# Patient Record
Sex: FEMALE | Race: WHITE | NOT HISPANIC OR LATINO | Employment: OTHER | URBAN - METROPOLITAN AREA
[De-identification: names, ages, dates, MRNs, and addresses within clinical notes are randomized per-mention and may not be internally consistent; named-entity substitution may affect disease eponyms.]

---

## 2019-05-02 ENCOUNTER — HOSPITAL ENCOUNTER (OUTPATIENT)
Dept: RADIOLOGY | Facility: MEDICAL CENTER | Age: 34
End: 2019-05-02
Attending: FAMILY MEDICINE
Payer: COMMERCIAL

## 2019-05-02 DIAGNOSIS — N92.0 EXCESSIVE OR FREQUENT MENSTRUATION: ICD-10-CM

## 2019-05-02 PROCEDURE — 76830 TRANSVAGINAL US NON-OB: CPT

## 2019-05-21 ENCOUNTER — APPOINTMENT (OUTPATIENT)
Dept: RADIOLOGY | Facility: MEDICAL CENTER | Age: 34
End: 2019-05-21
Attending: FAMILY MEDICINE
Payer: COMMERCIAL

## 2019-06-11 ENCOUNTER — APPOINTMENT (OUTPATIENT)
Dept: RADIOLOGY | Facility: MEDICAL CENTER | Age: 34
End: 2019-06-11
Attending: FAMILY MEDICINE
Payer: COMMERCIAL

## 2019-06-27 ENCOUNTER — HOSPITAL ENCOUNTER (OUTPATIENT)
Dept: RADIOLOGY | Facility: MEDICAL CENTER | Age: 34
End: 2019-06-27
Attending: FAMILY MEDICINE
Payer: COMMERCIAL

## 2019-06-27 DIAGNOSIS — N92.0 EXCESSIVE OR FREQUENT MENSTRUATION: ICD-10-CM

## 2019-06-27 PROCEDURE — 76830 TRANSVAGINAL US NON-OB: CPT

## 2019-07-23 ENCOUNTER — APPOINTMENT (OUTPATIENT)
Dept: RADIOLOGY | Facility: IMAGING CENTER | Age: 34
End: 2019-07-23
Attending: PHYSICIAN ASSISTANT
Payer: COMMERCIAL

## 2019-07-23 ENCOUNTER — OFFICE VISIT (OUTPATIENT)
Dept: URGENT CARE | Facility: CLINIC | Age: 34
End: 2019-07-23
Payer: COMMERCIAL

## 2019-07-23 VITALS
BODY MASS INDEX: 41.14 KG/M2 | TEMPERATURE: 97.6 F | SYSTOLIC BLOOD PRESSURE: 126 MMHG | WEIGHT: 256 LBS | RESPIRATION RATE: 16 BRPM | HEIGHT: 66 IN | DIASTOLIC BLOOD PRESSURE: 80 MMHG | OXYGEN SATURATION: 90 % | HEART RATE: 112 BPM

## 2019-07-23 DIAGNOSIS — R05.9 COUGH: ICD-10-CM

## 2019-07-23 DIAGNOSIS — J18.9 COMMUNITY ACQUIRED PNEUMONIA OF RIGHT LOWER LOBE OF LUNG: Primary | ICD-10-CM

## 2019-07-23 LAB
FLUAV+FLUBV AG SPEC QL IA: NEGATIVE
INT CON NEG: NORMAL
INT CON POS: NORMAL

## 2019-07-23 PROCEDURE — 99214 OFFICE O/P EST MOD 30 MIN: CPT | Mod: 25 | Performed by: PHYSICIAN ASSISTANT

## 2019-07-23 PROCEDURE — 71046 X-RAY EXAM CHEST 2 VIEWS: CPT | Mod: TC | Performed by: PHYSICIAN ASSISTANT

## 2019-07-23 PROCEDURE — 94640 AIRWAY INHALATION TREATMENT: CPT | Performed by: PHYSICIAN ASSISTANT

## 2019-07-23 PROCEDURE — 87804 INFLUENZA ASSAY W/OPTIC: CPT | Performed by: PHYSICIAN ASSISTANT

## 2019-07-23 RX ORDER — DOXYCYCLINE 100 MG/1
100 CAPSULE ORAL 2 TIMES DAILY
Qty: 14 CAP | Refills: 0 | Status: SHIPPED | OUTPATIENT
Start: 2019-07-23 | End: 2019-07-30

## 2019-07-23 RX ORDER — ALBUTEROL SULFATE 2.5 MG/3ML
2.5 SOLUTION RESPIRATORY (INHALATION) ONCE
Status: COMPLETED | OUTPATIENT
Start: 2019-07-23 | End: 2019-07-23

## 2019-07-23 RX ORDER — CODEINE PHOSPHATE/GUAIFENESIN 10-100MG/5
10 LIQUID (ML) ORAL
Qty: 50 ML | Refills: 0 | Status: SHIPPED | OUTPATIENT
Start: 2019-07-23 | End: 2019-07-28

## 2019-07-23 RX ORDER — METHYLPREDNISOLONE 4 MG/1
TABLET ORAL
Qty: 21 TAB | Refills: 0 | Status: SHIPPED | OUTPATIENT
Start: 2019-07-23 | End: 2019-09-16

## 2019-07-23 RX ADMIN — ALBUTEROL SULFATE 2.5 MG: 2.5 SOLUTION RESPIRATORY (INHALATION) at 19:27

## 2019-07-23 ASSESSMENT — ENCOUNTER SYMPTOMS
VOMITING: 0
NAUSEA: 0
SHORTNESS OF BREATH: 1
FEVER: 1
WHEEZING: 1
ABDOMINAL PAIN: 0
COUGH: 1
SPUTUM PRODUCTION: 0
DIARRHEA: 1
SORE THROAT: 0
CONSTIPATION: 0
CHILLS: 0

## 2019-07-24 NOTE — PATIENT INSTRUCTIONS
Community-Acquired Pneumonia, Adult  Pneumonia is an infection of the lungs. There are different types of pneumonia. One type can develop while a person is in a hospital. A different type, called community-acquired pneumonia, develops in people who are not, or have not recently been, in the hospital or other health care facility.  What are the causes?  Pneumonia may be caused by bacteria, viruses, or funguses. Community-acquired pneumonia is often caused by Streptococcus pneumonia bacteria. These bacteria are often passed from one person to another by breathing in droplets from the cough or sneeze of an infected person.  What increases the risk?  The condition is more likely to develop in:  · People who have chronic diseases, such as chronic obstructive pulmonary disease (COPD), asthma, congestive heart failure, cystic fibrosis, diabetes, or kidney disease.  · People who have early-stage or late-stage HIV.  · People who have sickle cell disease.  · People who have had their spleen removed (splenectomy).  · People who have poor dental hygiene.  · People who have medical conditions that increase the risk of breathing in (aspirating) secretions their own mouth and nose.  · People who have a weakened immune system (immunocompromised).  · People who smoke.  · People who travel to areas where pneumonia-causing germs commonly exist.  · People who are around animal habitats or animals that have pneumonia-causing germs, including birds, bats, rabbits, cats, and farm animals.  What are the signs or symptoms?  Symptoms of this condition include:  · A dry cough.  · A wet (productive) cough.  · Fever.  · Sweating.  · Chest pain, especially when breathing deeply or coughing.  · Rapid breathing or difficulty breathing.  · Shortness of breath.  · Shaking chills.  · Fatigue.  · Muscle aches.  How is this diagnosed?  Your health care provider will take a medical history and perform a physical exam. You may also have other tests,  including:  · Imaging studies of your chest, including X-rays.  · Tests to check your blood oxygen level and other blood gases.  · Other tests on blood, mucus (sputum), fluid around your lungs (pleural fluid), and urine.  If your pneumonia is severe, other tests may be done to identify the specific cause of your illness.  How is this treated?  The type of treatment that you receive depends on many factors, such as the cause of your pneumonia, the medicines you take, and other medical conditions that you have. For most adults, treatment and recovery from pneumonia may occur at home. In some cases, treatment must happen in a hospital. Treatment may include:  · Antibiotic medicines, if the pneumonia was caused by bacteria.  · Antiviral medicines, if the pneumonia was caused by a virus.  · Medicines that are given by mouth or through an IV tube.  · Oxygen.  · Respiratory therapy.  Although rare, treating severe pneumonia may include:  · Mechanical ventilation. This is done if you are not breathing well on your own and you cannot maintain a safe blood oxygen level.  · Thoracentesis. This procedure removes fluid around one lung or both lungs to help you breathe better.  Follow these instructions at home:  · Take over-the-counter and prescription medicines only as told by your health care provider.  ¨ Only take cough medicine if you are losing sleep. Understand that cough medicine can prevent your body’s natural ability to remove mucus from your lungs.  ¨ If you were prescribed an antibiotic medicine, take it as told by your health care provider. Do not stop taking the antibiotic even if you start to feel better.  · Sleep in a semi-upright position at night. Try sleeping in a reclining chair, or place a few pillows under your head.  · Do not use tobacco products, including cigarettes, chewing tobacco, and e-cigarettes. If you need help quitting, ask your health care provider.  · Drink enough water to keep your urine clear  or pale yellow. This will help to thin out mucus secretions in your lungs.  How is this prevented?  There are ways that you can decrease your risk of developing community-acquired pneumonia. Consider getting a pneumococcal vaccine if:  · You are older than 65 years of age.  · You are older than 19 years of age and are undergoing cancer treatment, have chronic lung disease, or have other medical conditions that affect your immune system. Ask your health care provider if this applies to you.  There are different types and schedules of pneumococcal vaccines. Ask your health care provider which vaccination option is best for you.  You may also prevent community-acquired pneumonia if you take these actions:  · Get an influenza vaccine every year. Ask your health care provider which type of influenza vaccine is best for you.  · Go to the dentist on a regular basis.  · Wash your hands often. Use hand  if soap and water are not available.  Contact a health care provider if:  · You have a fever.  · You are losing sleep because you cannot control your cough with cough medicine.  Get help right away if:  · You have worsening shortness of breath.  · You have increased chest pain.  · Your sickness becomes worse, especially if you are an older adult or have a weakened immune system.  · You cough up blood.  This information is not intended to replace advice given to you by your health care provider. Make sure you discuss any questions you have with your health care provider.  Document Released: 12/18/2006 Document Revised: 04/27/2017 Document Reviewed: 04/13/2016  [employer] Interactive Patient Education © 2017 [employer]

## 2019-07-24 NOTE — PROGRESS NOTES
Subjective:   Janel Benton is a 34 y.o. female who presents for Cough (cough, sweating x 1 week)        Cough   This is a new problem. Episode onset: 1 week  The problem has been unchanged. The cough is productive of sputum. Associated symptoms include ear pain, a fever, shortness of breath and wheezing. Pertinent negatives include no chills, ear congestion, nasal congestion or sore throat. The symptoms are aggravated by stress. Risk factors: Nonsmoker. Children with URI sx  Treatments tried: Advil, tylenol. The treatment provided mild relief. Her past medical history is significant for bronchitis. There is no history of asthma or pneumonia.     Review of Systems   Constitutional: Positive for fever. Negative for chills and malaise/fatigue.   HENT: Positive for congestion and ear pain. Negative for sore throat.    Respiratory: Positive for cough, shortness of breath and wheezing. Negative for sputum production.    Gastrointestinal: Positive for diarrhea. Negative for abdominal pain, constipation, nausea and vomiting.   All other systems reviewed and are negative.      PMH:  has no past medical history on file.  MEDS:   Current Outpatient Prescriptions:   •  doxycycline (MONODOX) 100 MG capsule, Take 1 Cap by mouth 2 times a day for 7 days., Disp: 14 Cap, Rfl: 0  •  methylPREDNISolone (MEDROL DOSEPAK) 4 MG Tablet Therapy Pack, Per  directions on package, Disp: 21 Tab, Rfl: 0  •  guaifenesin-codeine (TUSSI-ORGANIDIN NR) 100-10 MG/5ML syrup, Take 10 mL by mouth every bedtime for 5 days., Disp: 50 mL, Rfl: 0  •  paroxetine (PAXIL) 20 MG TABS, TAKE ONE TABLET BY MOUTH ONE TIME DAILY WITH FOOD, Disp: 30 Tab, Rfl: 0  •  clonazepam (KLONOPIN) 0.5 MG TABS, Take 0.5 Tabs by mouth 1 time daily as needed., Disp: 30 Tab, Rfl: 0  ALLERGIES:   Allergies   Allergen Reactions   • Gluten      SURGHX: No past surgical history on file.  SOCHX:  reports that she has never smoked. She has never used smokeless tobacco. She  "reports that she drinks alcohol. She reports that she does not use drugs.  Family History   Problem Relation Age of Onset   • Psychiatry Mother         anxiety   • Hyperlipidemia Mother    • GI Brother         Objective:   /80 (BP Location: Left arm, Patient Position: Sitting, BP Cuff Size: Large adult)   Pulse (!) 112   Temp 36.4 °C (97.6 °F) (Temporal)   Resp 16   Ht 1.676 m (5' 5.98\")   Wt 116.1 kg (256 lb)   SpO2 90%   BMI 41.34 kg/m²     Physical Exam   Constitutional: She is oriented to person, place, and time. She appears well-developed and well-nourished. No distress.   HENT:   Head: Normocephalic and atraumatic.   Nose: Nose normal.   Eyes: Pupils are equal, round, and reactive to light. Conjunctivae are normal.   Neck: Normal range of motion. Neck supple. No tracheal deviation present.   Cardiovascular: Normal rate and regular rhythm.    Pulmonary/Chest: Effort normal. She has wheezes in the right middle field and the right lower field.   Lymphadenopathy:     She has no cervical adenopathy.   Neurological: She is alert and oriented to person, place, and time.   Skin: Skin is warm and dry. Capillary refill takes less than 2 seconds.   Psychiatric: She has a normal mood and affect. Her behavior is normal.   Vitals reviewed.     FINDINGS:    The heart is not enlarged. There are bilateral airspace opacities, most prominent in the right lower lobe likely representing pneumonia. No pleural effusion or pneumothorax is identified.       Impression       Bilateral airspace opacities likely representing pneumonia, most prominent in the right lower lobe. Follow-up to radiographic resolution recommended.     Influenza: neg       Assessment/Plan:     1. Community acquired pneumonia of right lower lobe of lung (HCC)  doxycycline (MONODOX) 100 MG capsule    methylPREDNISolone (MEDROL DOSEPAK) 4 MG Tablet Therapy Pack    guaifenesin-codeine (TUSSI-ORGANIDIN NR) 100-10 MG/5ML syrup    REFERRAL TO FOLLOW-UP " WITH PRIMARY CARE   2. Cough  DX-CHEST-2 VIEWS    albuterol (PROVENTIL) 2.5mg/3ml nebulizer solution 2.5 mg    POCT Influenza A/B     Per my read, bilateral opacities R > L seen on x-ray.  Agree with radiology report.     Supportive care reviewed. CAP handout provided.     NV  was reviewed by myself-  Document  does not reveal any concerning patterns. Pt. was advised to avoid the operation of heavy machine along with driving while on such medications. Finally pt. was advised to use medication only as prescribed.     Follow-up with primary care provider within 7-10 days, referral placed.  If symptoms worsen or persist patient can return to clinic immediately for reevaluation.  Red flags and STRICT emergency room precautions discussed. All side effects of medication discussed including allergic response, GI upset, tendon injury, etc. Patient and family verbalized understanding of information.    Please note that this dictation was created using voice recognition software. I have made every reasonable attempt to correct obvious errors, but I expect that there are errors of grammar and possibly content that I did not discover before finalizing the note.

## 2019-08-23 ENCOUNTER — HOSPITAL ENCOUNTER (OUTPATIENT)
Dept: RADIOLOGY | Facility: MEDICAL CENTER | Age: 34
End: 2019-08-23
Attending: FAMILY MEDICINE
Payer: COMMERCIAL

## 2019-08-23 DIAGNOSIS — J15.8 PNEUMONIA DUE TO ANAEROBES (HCC): ICD-10-CM

## 2019-08-23 PROCEDURE — 71046 X-RAY EXAM CHEST 2 VIEWS: CPT

## 2019-09-11 ENCOUNTER — HOSPITAL ENCOUNTER (OUTPATIENT)
Dept: RADIOLOGY | Facility: MEDICAL CENTER | Age: 34
End: 2019-09-11
Attending: FAMILY MEDICINE
Payer: COMMERCIAL

## 2019-09-11 DIAGNOSIS — M79.671 RIGHT FOOT PAIN: ICD-10-CM

## 2019-09-11 PROCEDURE — 73620 X-RAY EXAM OF FOOT: CPT | Mod: RT

## 2019-09-16 DIAGNOSIS — Z01.812 PRE-PROCEDURAL LABORATORY EXAMINATION: ICD-10-CM

## 2019-09-16 DIAGNOSIS — Z01.810 PRE-OPERATIVE CARDIOVASCULAR EXAMINATION: ICD-10-CM

## 2019-09-16 LAB
ABO GROUP BLD: NORMAL
ALBUMIN SERPL BCP-MCNC: 3.9 G/DL (ref 3.2–4.9)
ALBUMIN/GLOB SERPL: 1.2 G/DL
ALP SERPL-CCNC: 69 U/L (ref 30–99)
ALT SERPL-CCNC: 6 U/L (ref 2–50)
ANION GAP SERPL CALC-SCNC: 11 MMOL/L (ref 0–11.9)
APPEARANCE UR: CLEAR
AST SERPL-CCNC: 10 U/L (ref 12–45)
B-HCG SERPL-ACNC: <0.6 MIU/ML (ref 0–5)
BACTERIA #/AREA URNS HPF: ABNORMAL /HPF
BASOPHILS # BLD AUTO: 0.5 % (ref 0–1.8)
BASOPHILS # BLD: 0.04 K/UL (ref 0–0.12)
BILIRUB SERPL-MCNC: 0.2 MG/DL (ref 0.1–1.5)
BILIRUB UR QL STRIP.AUTO: NEGATIVE
BLD GP AB SCN SERPL QL: NORMAL
BUN SERPL-MCNC: 12 MG/DL (ref 8–22)
CALCIUM SERPL-MCNC: 9.1 MG/DL (ref 8.5–10.5)
CHLORIDE SERPL-SCNC: 106 MMOL/L (ref 96–112)
CO2 SERPL-SCNC: 24 MMOL/L (ref 20–33)
COLOR UR: YELLOW
CREAT SERPL-MCNC: 0.8 MG/DL (ref 0.5–1.4)
EKG IMPRESSION: NORMAL
EOSINOPHIL # BLD AUTO: 0.49 K/UL (ref 0–0.51)
EOSINOPHIL NFR BLD: 6.6 % (ref 0–6.9)
EPI CELLS #/AREA URNS HPF: NEGATIVE /HPF
ERYTHROCYTE [DISTWIDTH] IN BLOOD BY AUTOMATED COUNT: 50.8 FL (ref 35.9–50)
GLOBULIN SER CALC-MCNC: 3.3 G/DL (ref 1.9–3.5)
GLUCOSE SERPL-MCNC: 115 MG/DL (ref 65–99)
GLUCOSE UR STRIP.AUTO-MCNC: NEGATIVE MG/DL
HCT VFR BLD AUTO: 39 % (ref 37–47)
HGB BLD-MCNC: 12.1 G/DL (ref 12–16)
HYALINE CASTS #/AREA URNS LPF: ABNORMAL /LPF
IMM GRANULOCYTES # BLD AUTO: 0.02 K/UL (ref 0–0.11)
IMM GRANULOCYTES NFR BLD AUTO: 0.3 % (ref 0–0.9)
KETONES UR STRIP.AUTO-MCNC: NEGATIVE MG/DL
LEUKOCYTE ESTERASE UR QL STRIP.AUTO: ABNORMAL
LYMPHOCYTES # BLD AUTO: 1.86 K/UL (ref 1–4.8)
LYMPHOCYTES NFR BLD: 25 % (ref 22–41)
MCH RBC QN AUTO: 24.7 PG (ref 27–33)
MCHC RBC AUTO-ENTMCNC: 31 G/DL (ref 33.6–35)
MCV RBC AUTO: 79.8 FL (ref 81.4–97.8)
MICRO URNS: ABNORMAL
MONOCYTES # BLD AUTO: 0.51 K/UL (ref 0–0.85)
MONOCYTES NFR BLD AUTO: 6.9 % (ref 0–13.4)
NEUTROPHILS # BLD AUTO: 4.51 K/UL (ref 2–7.15)
NEUTROPHILS NFR BLD: 60.7 % (ref 44–72)
NITRITE UR QL STRIP.AUTO: NEGATIVE
NRBC # BLD AUTO: 0 K/UL
NRBC BLD-RTO: 0 /100 WBC
PH UR STRIP.AUTO: 5.5 [PH] (ref 5–8)
PLATELET # BLD AUTO: 423 K/UL (ref 164–446)
PMV BLD AUTO: 9.1 FL (ref 9–12.9)
POTASSIUM SERPL-SCNC: 3.8 MMOL/L (ref 3.6–5.5)
PROT SERPL-MCNC: 7.2 G/DL (ref 6–8.2)
PROT UR QL STRIP: NEGATIVE MG/DL
RBC # BLD AUTO: 4.89 M/UL (ref 4.2–5.4)
RBC # URNS HPF: ABNORMAL /HPF
RBC UR QL AUTO: NEGATIVE
RH BLD: NORMAL
SODIUM SERPL-SCNC: 141 MMOL/L (ref 135–145)
SP GR UR STRIP.AUTO: 1.02
UROBILINOGEN UR STRIP.AUTO-MCNC: 0.2 MG/DL
WBC # BLD AUTO: 7.4 K/UL (ref 4.8–10.8)
WBC #/AREA URNS HPF: ABNORMAL /HPF

## 2019-09-16 PROCEDURE — 80053 COMPREHEN METABOLIC PANEL: CPT

## 2019-09-16 PROCEDURE — 86900 BLOOD TYPING SEROLOGIC ABO: CPT

## 2019-09-16 PROCEDURE — 36415 COLL VENOUS BLD VENIPUNCTURE: CPT

## 2019-09-16 PROCEDURE — 93005 ELECTROCARDIOGRAM TRACING: CPT | Performed by: OBSTETRICS & GYNECOLOGY

## 2019-09-16 PROCEDURE — 84702 CHORIONIC GONADOTROPIN TEST: CPT

## 2019-09-16 PROCEDURE — 86901 BLOOD TYPING SEROLOGIC RH(D): CPT

## 2019-09-16 PROCEDURE — 85025 COMPLETE CBC W/AUTO DIFF WBC: CPT

## 2019-09-16 PROCEDURE — 86850 RBC ANTIBODY SCREEN: CPT

## 2019-09-16 PROCEDURE — 81001 URINALYSIS AUTO W/SCOPE: CPT

## 2019-09-16 RX ORDER — DIPHENHYDRAMINE HCL 25 MG
25 TABLET ORAL PRN
COMMUNITY

## 2019-09-16 RX ORDER — CITALOPRAM 20 MG/1
30 TABLET ORAL DAILY
COMMUNITY

## 2019-09-16 RX ORDER — VALACYCLOVIR HYDROCHLORIDE 1 G/1
1000 TABLET, FILM COATED ORAL DAILY
COMMUNITY
Start: 2019-09-21 | End: 2019-09-30

## 2019-09-16 RX ORDER — IBUPROFEN 200 MG
400 TABLET ORAL DAILY
Status: ON HOLD | COMMUNITY
End: 2019-09-26 | Stop reason: SDUPTHER

## 2019-09-16 RX ORDER — CEPHALEXIN 500 MG/1
500 CAPSULE ORAL 4 TIMES DAILY
COMMUNITY
Start: 2019-09-16 | End: 2019-09-22

## 2019-09-20 NOTE — H&P
She will be admitted to [facility] on Monday, 2019, for a scheduled total   laparoscopic hysterectomy.    HISTORY OF PRESENT ILLNESS:  The patient is a 34-year-old female  3,   para 3 who presented to my office with a history of pelvic pain.  The pelvic   pain has been gradually worsening over the past 10 years.  It is located in   left lower quadrant, lower back, right lower quadrant, suprapubic, vaginal and   her pelvic regions.  The pain radiates to her thighs.  She has severe   cramping during her periods.  Her periods are regular, but last 6-15 days at a   time.  She has heavy flow with clots.  The patient has been on birth control   pills for the past 5 months without relief of her symptoms.  She also has had   no relief with anti-inflammatories.  The patient had a pelvic ultrasound done   in 2019 that showed her uterus to be 7x9x7 cm with a 2.6 cm fundal fibroid,   a complex 5.7 cm left ovarian cyst and a 3.1 cm right ovarian cyst.  The   patient has also had a history of anemia due to her heavy periods.  The   patient desires definitive surgical management of her symptoms in the form of   a hysterectomy as she has completed childbearing.  The patient presented to   the office for her preoperative appointment.  She is doing well and has no   complaints.    PAST OBSTETRICAL HISTORY:  Three vaginal deliveries, 1 full term and 2   pre-term.    PAST GYNECOLOGICAL HISTORY:  As above.  Last Pap 2019, normal.  No abnormal   Pap smears.  No history of sexually transmitted infections.    PAST MEDICAL HISTORY:  Anemia, depression, and intestinal problems.    PAST SURGICAL HISTORY:  [employer] teeth removal.    MEDICATIONS:  Citalopram 20 mg daily.    ALLERGIES:  TO DARVOCET-N 100, WHICH CAUSES HER TO FEEL CRAZY.    SOCIAL HISTORY:  She is , admits to smoking marijuana.  No other drug   use.  No alcohol abuse.    FAMILY HISTORY:  Positive for diabetes, breast cancer and colon cancer.    REVIEW OF  SYSTEMS:  All systems are reviewed and are negative except as stated   above.    PHYSICAL EXAMINATION:  VITAL SIGNS:  Stable.  The patient is afebrile.  GENERAL APPEARANCE:  Well-developed, well-nourished female in no acute   distress.  NECK:  Supple, nontender.  CARDIOVASCULAR:  Heart is regular rate and rhythm.  RESPIRATORY:  Lungs are clear to auscultation bilaterally.  ABDOMEN:  Soft, obese, nontender, nondistended.  No rebound or guarding noted.  EXTREMITIES:  Nontender bilaterally without cyanosis, clubbing or edema.    ASSESSMENT:  This is a 34-year-old female  3, para 3 with pelvic pain,   leiomyoma of the uterus, dysmenorrhea, dyspareunia, and bilateral ovarian   cysts.    PLAN:  The patient has been scheduled for total laparoscopic hysterectomy,   bilateral salpingectomy, bilateral ovarian cystectomy and possible   oophorectomy on 2019 at [facility].  The patient was counseled on the   surgical procedure in detail and the risks, benefits, and alternatives to her   procedure.  The patient is aware that the risks include, but are not limited   to pain, infection, bleeding, blood transfusion, injury to adjacent organs   including the bowel, bladder, blood vessels, nerves, and ureters, anesthesia   complications and death.  All the patient's questions were answered.  She   understands and she desires to proceed with surgery.  The patient will have   outpatient hysterectomy procedure.  She plans to go home the same day.  She   was given prescriptions for postoperative pain control including Percocet and   Motrin, and she was also given a prescription of a stool softener.  The   patient will follow up in the office 2 weeks postoperative.       ____________________________________     MD YURIDIA Patel / KEERTHI    DD:  2019 16:59:52  DT:  2019 18:33:29    D#:  [id number]  Job#:  [id number]

## 2019-09-23 ENCOUNTER — HOSPITAL ENCOUNTER (INPATIENT)
Facility: MEDICAL CENTER | Age: 34
LOS: 3 days | DRG: 742 | End: 2019-09-26
Attending: OBSTETRICS & GYNECOLOGY | Admitting: OBSTETRICS & GYNECOLOGY
Payer: COMMERCIAL

## 2019-09-23 ENCOUNTER — ANESTHESIA (OUTPATIENT)
Dept: SURGERY | Facility: MEDICAL CENTER | Age: 34
DRG: 742 | End: 2019-09-23
Payer: COMMERCIAL

## 2019-09-23 ENCOUNTER — ANESTHESIA EVENT (OUTPATIENT)
Dept: SURGERY | Facility: MEDICAL CENTER | Age: 34
DRG: 742 | End: 2019-09-23
Payer: COMMERCIAL

## 2019-09-23 LAB
ABO + RH BLD: NORMAL
B-HCG FREE SERPL-ACNC: <5 MIU/ML
ERYTHROCYTE [DISTWIDTH] IN BLOOD BY AUTOMATED COUNT: 51.2 FL (ref 35.9–50)
HCT VFR BLD AUTO: 35.1 % (ref 37–47)
HGB BLD-MCNC: 10.5 G/DL (ref 12–16)
IHCGL IHCGL: NEGATIVE MIU/ML
MCH RBC QN AUTO: 24.3 PG (ref 27–33)
MCHC RBC AUTO-ENTMCNC: 29.9 G/DL (ref 33.6–35)
MCV RBC AUTO: 81.3 FL (ref 81.4–97.8)
MORPHOLOGY BLD-IMP: NORMAL
PATHOLOGY CONSULT NOTE: NORMAL
PLATELET # BLD AUTO: 423 K/UL (ref 164–446)
PMV BLD AUTO: 9 FL (ref 9–12.9)
RBC # BLD AUTO: 4.32 M/UL (ref 4.2–5.4)
WBC # BLD AUTO: 18.8 K/UL (ref 4.8–10.8)

## 2019-09-23 PROCEDURE — 502703 HCHG DEVICE, LIGASURE V SEALER: Performed by: OBSTETRICS & GYNECOLOGY

## 2019-09-23 PROCEDURE — 700105 HCHG RX REV CODE 258: Performed by: OBSTETRICS & GYNECOLOGY

## 2019-09-23 PROCEDURE — 160029 HCHG SURGERY MINUTES - 1ST 30 MINS LEVEL 4: Performed by: OBSTETRICS & GYNECOLOGY

## 2019-09-23 PROCEDURE — 0UN20ZZ RELEASE BILATERAL OVARIES, OPEN APPROACH: ICD-10-PCS | Performed by: OBSTETRICS & GYNECOLOGY

## 2019-09-23 PROCEDURE — 500800 HCHG LAPAROSCOPIC J/L HOOK: Performed by: OBSTETRICS & GYNECOLOGY

## 2019-09-23 PROCEDURE — 0DNW0ZZ RELEASE PERITONEUM, OPEN APPROACH: ICD-10-PCS | Performed by: OBSTETRICS & GYNECOLOGY

## 2019-09-23 PROCEDURE — 700105 HCHG RX REV CODE 258: Performed by: ANESTHESIOLOGY

## 2019-09-23 PROCEDURE — 160041 HCHG SURGERY MINUTES - EA ADDL 1 MIN LEVEL 4: Performed by: OBSTETRICS & GYNECOLOGY

## 2019-09-23 PROCEDURE — 0DJD8ZZ INSPECTION OF LOWER INTESTINAL TRACT, VIA NATURAL OR ARTIFICIAL OPENING ENDOSCOPIC: ICD-10-PCS | Performed by: SURGERY

## 2019-09-23 PROCEDURE — 160002 HCHG RECOVERY MINUTES (STAT): Performed by: OBSTETRICS & GYNECOLOGY

## 2019-09-23 PROCEDURE — A4314 CATH W/DRAINAGE 2-WAY LATEX: HCPCS | Performed by: OBSTETRICS & GYNECOLOGY

## 2019-09-23 PROCEDURE — 160048 HCHG OR STATISTICAL LEVEL 1-5: Performed by: OBSTETRICS & GYNECOLOGY

## 2019-09-23 PROCEDURE — 700112 HCHG RX REV CODE 229: Performed by: OBSTETRICS & GYNECOLOGY

## 2019-09-23 PROCEDURE — 501497 HCHG SURGICLIP: Performed by: OBSTETRICS & GYNECOLOGY

## 2019-09-23 PROCEDURE — 501570 HCHG TROCAR, SEPARATOR: Performed by: OBSTETRICS & GYNECOLOGY

## 2019-09-23 PROCEDURE — 700104 HCHG RX REV CODE 254: Performed by: ANESTHESIOLOGY

## 2019-09-23 PROCEDURE — 84702 CHORIONIC GONADOTROPIN TEST: CPT

## 2019-09-23 PROCEDURE — 0UT70ZZ RESECTION OF BILATERAL FALLOPIAN TUBES, OPEN APPROACH: ICD-10-PCS | Performed by: OBSTETRICS & GYNECOLOGY

## 2019-09-23 PROCEDURE — 500522 HCHG ENDOSTITCH SUTURING DEVICE: Performed by: OBSTETRICS & GYNECOLOGY

## 2019-09-23 PROCEDURE — 700102 HCHG RX REV CODE 250 W/ 637 OVERRIDE(OP): Performed by: OBSTETRICS & GYNECOLOGY

## 2019-09-23 PROCEDURE — 0DQP0ZZ REPAIR RECTUM, OPEN APPROACH: ICD-10-PCS | Performed by: SURGERY

## 2019-09-23 PROCEDURE — A6402 STERILE GAUZE <= 16 SQ IN: HCPCS | Performed by: OBSTETRICS & GYNECOLOGY

## 2019-09-23 PROCEDURE — 500002 HCHG ADHESIVE, DERMABOND: Performed by: OBSTETRICS & GYNECOLOGY

## 2019-09-23 PROCEDURE — 501577 HCHG TROCAR, STEP 11MM: Performed by: OBSTETRICS & GYNECOLOGY

## 2019-09-23 PROCEDURE — 85027 COMPLETE CBC AUTOMATED: CPT

## 2019-09-23 PROCEDURE — 501838 HCHG SUTURE GENERAL: Performed by: OBSTETRICS & GYNECOLOGY

## 2019-09-23 PROCEDURE — 700111 HCHG RX REV CODE 636 W/ 250 OVERRIDE (IP): Performed by: OBSTETRICS & GYNECOLOGY

## 2019-09-23 PROCEDURE — 770020 HCHG ROOM/CARE - TELE (206)

## 2019-09-23 PROCEDURE — 160036 HCHG PACU - EA ADDL 30 MINS PHASE I: Performed by: OBSTETRICS & GYNECOLOGY

## 2019-09-23 PROCEDURE — 36415 COLL VENOUS BLD VENIPUNCTURE: CPT

## 2019-09-23 PROCEDURE — 160009 HCHG ANES TIME/MIN: Performed by: OBSTETRICS & GYNECOLOGY

## 2019-09-23 PROCEDURE — 700101 HCHG RX REV CODE 250: Performed by: ANESTHESIOLOGY

## 2019-09-23 PROCEDURE — 0UNF0ZZ RELEASE CUL-DE-SAC, OPEN APPROACH: ICD-10-PCS | Performed by: OBSTETRICS & GYNECOLOGY

## 2019-09-23 PROCEDURE — 0UT90ZZ RESECTION OF UTERUS, OPEN APPROACH: ICD-10-PCS | Performed by: OBSTETRICS & GYNECOLOGY

## 2019-09-23 PROCEDURE — 88307 TISSUE EXAM BY PATHOLOGIST: CPT

## 2019-09-23 PROCEDURE — 0UJD4ZZ INSPECTION OF UTERUS AND CERVIX, PERCUTANEOUS ENDOSCOPIC APPROACH: ICD-10-PCS | Performed by: OBSTETRICS & GYNECOLOGY

## 2019-09-23 PROCEDURE — 500512 HCHG ENDO PEANUT: Performed by: OBSTETRICS & GYNECOLOGY

## 2019-09-23 PROCEDURE — A9270 NON-COVERED ITEM OR SERVICE: HCPCS | Performed by: OBSTETRICS & GYNECOLOGY

## 2019-09-23 PROCEDURE — 501399 HCHG SPECIMAN BAG, ENDO CATC: Performed by: OBSTETRICS & GYNECOLOGY

## 2019-09-23 PROCEDURE — 501581 HCHG TROCAR: Performed by: OBSTETRICS & GYNECOLOGY

## 2019-09-23 PROCEDURE — 700111 HCHG RX REV CODE 636 W/ 250 OVERRIDE (IP): Performed by: ANESTHESIOLOGY

## 2019-09-23 PROCEDURE — 700102 HCHG RX REV CODE 250 W/ 637 OVERRIDE(OP): Performed by: ANESTHESIOLOGY

## 2019-09-23 PROCEDURE — 501584 HCHG TROCAR, THRD CAN&SEAL11X100: Performed by: OBSTETRICS & GYNECOLOGY

## 2019-09-23 PROCEDURE — A9270 NON-COVERED ITEM OR SERVICE: HCPCS | Performed by: ANESTHESIOLOGY

## 2019-09-23 PROCEDURE — A4450 NON-WATERPROOF TAPE: HCPCS | Performed by: OBSTETRICS & GYNECOLOGY

## 2019-09-23 PROCEDURE — C1713 ANCHOR/SCREW BN/BN,TIS/BN: HCPCS | Performed by: OBSTETRICS & GYNECOLOGY

## 2019-09-23 PROCEDURE — 0UT10ZZ RESECTION OF LEFT OVARY, OPEN APPROACH: ICD-10-PCS | Performed by: OBSTETRICS & GYNECOLOGY

## 2019-09-23 PROCEDURE — 160035 HCHG PACU - 1ST 60 MINS PHASE I: Performed by: OBSTETRICS & GYNECOLOGY

## 2019-09-23 RX ORDER — HYDROMORPHONE HYDROCHLORIDE 1 MG/ML
0.2 INJECTION, SOLUTION INTRAMUSCULAR; INTRAVENOUS; SUBCUTANEOUS
Status: DISCONTINUED | OUTPATIENT
Start: 2019-09-23 | End: 2019-09-23 | Stop reason: HOSPADM

## 2019-09-23 RX ORDER — SODIUM CHLORIDE, SODIUM LACTATE, POTASSIUM CHLORIDE, CALCIUM CHLORIDE 600; 310; 30; 20 MG/100ML; MG/100ML; MG/100ML; MG/100ML
INJECTION, SOLUTION INTRAVENOUS CONTINUOUS
Status: DISCONTINUED | OUTPATIENT
Start: 2019-09-23 | End: 2019-09-23 | Stop reason: HOSPADM

## 2019-09-23 RX ORDER — OXYCODONE HYDROCHLORIDE 5 MG/1
5 TABLET ORAL
Status: DISCONTINUED | OUTPATIENT
Start: 2019-09-23 | End: 2019-09-26 | Stop reason: HOSPADM

## 2019-09-23 RX ORDER — DEXAMETHASONE SODIUM PHOSPHATE 4 MG/ML
INJECTION, SOLUTION INTRA-ARTICULAR; INTRALESIONAL; INTRAMUSCULAR; INTRAVENOUS; SOFT TISSUE PRN
Status: DISCONTINUED | OUTPATIENT
Start: 2019-09-23 | End: 2019-09-23 | Stop reason: SURG

## 2019-09-23 RX ORDER — ONDANSETRON 2 MG/ML
4 INJECTION INTRAMUSCULAR; INTRAVENOUS EVERY 4 HOURS PRN
Status: DISCONTINUED | OUTPATIENT
Start: 2019-09-23 | End: 2019-09-26 | Stop reason: HOSPADM

## 2019-09-23 RX ORDER — SODIUM CHLORIDE, SODIUM LACTATE, POTASSIUM CHLORIDE, CALCIUM CHLORIDE 600; 310; 30; 20 MG/100ML; MG/100ML; MG/100ML; MG/100ML
INJECTION, SOLUTION INTRAVENOUS CONTINUOUS
Status: DISPENSED | OUTPATIENT
Start: 2019-09-23 | End: 2019-09-23

## 2019-09-23 RX ORDER — OXYCODONE HCL 5 MG/5 ML
5 SOLUTION, ORAL ORAL
Status: COMPLETED | OUTPATIENT
Start: 2019-09-23 | End: 2019-09-23

## 2019-09-23 RX ORDER — EPINEPHRINE 1 MG/ML(1)
AMPUL (ML) INJECTION
Status: COMPLETED
Start: 2019-09-23 | End: 2019-09-23

## 2019-09-23 RX ORDER — MEPERIDINE HYDROCHLORIDE 25 MG/ML
6.25 INJECTION INTRAMUSCULAR; INTRAVENOUS; SUBCUTANEOUS
Status: DISCONTINUED | OUTPATIENT
Start: 2019-09-23 | End: 2019-09-23 | Stop reason: HOSPADM

## 2019-09-23 RX ORDER — HALOPERIDOL 5 MG/ML
1 INJECTION INTRAMUSCULAR
Status: DISCONTINUED | OUTPATIENT
Start: 2019-09-23 | End: 2019-09-23 | Stop reason: HOSPADM

## 2019-09-23 RX ORDER — DOCUSATE SODIUM 100 MG/1
100 CAPSULE, LIQUID FILLED ORAL 2 TIMES DAILY
Status: DISCONTINUED | OUTPATIENT
Start: 2019-09-23 | End: 2019-09-26 | Stop reason: HOSPADM

## 2019-09-23 RX ORDER — OXYCODONE HYDROCHLORIDE 10 MG/1
10 TABLET ORAL
Status: DISCONTINUED | OUTPATIENT
Start: 2019-09-23 | End: 2019-09-26 | Stop reason: HOSPADM

## 2019-09-23 RX ORDER — HYDROMORPHONE HYDROCHLORIDE 1 MG/ML
0.1 INJECTION, SOLUTION INTRAMUSCULAR; INTRAVENOUS; SUBCUTANEOUS
Status: DISCONTINUED | OUTPATIENT
Start: 2019-09-23 | End: 2019-09-23 | Stop reason: HOSPADM

## 2019-09-23 RX ORDER — AMOXICILLIN 250 MG
1 CAPSULE ORAL NIGHTLY
Status: DISCONTINUED | OUTPATIENT
Start: 2019-09-23 | End: 2019-09-26 | Stop reason: HOSPADM

## 2019-09-23 RX ORDER — DIPHENHYDRAMINE HYDROCHLORIDE 50 MG/ML
12.5 INJECTION INTRAMUSCULAR; INTRAVENOUS
Status: DISCONTINUED | OUTPATIENT
Start: 2019-09-23 | End: 2019-09-23 | Stop reason: HOSPADM

## 2019-09-23 RX ORDER — SCOLOPAMINE TRANSDERMAL SYSTEM 1 MG/1
1 PATCH, EXTENDED RELEASE TRANSDERMAL
Status: DISCONTINUED | OUTPATIENT
Start: 2019-09-23 | End: 2019-09-26 | Stop reason: HOSPADM

## 2019-09-23 RX ORDER — OXYCODONE HCL 5 MG/5 ML
10 SOLUTION, ORAL ORAL
Status: COMPLETED | OUTPATIENT
Start: 2019-09-23 | End: 2019-09-23

## 2019-09-23 RX ORDER — POLYETHYLENE GLYCOL 3350 17 G/17G
1 POWDER, FOR SOLUTION ORAL 2 TIMES DAILY PRN
Status: DISCONTINUED | OUTPATIENT
Start: 2019-09-23 | End: 2019-09-26 | Stop reason: HOSPADM

## 2019-09-23 RX ORDER — ROCURONIUM BROMIDE 10 MG/ML
INJECTION, SOLUTION INTRAVENOUS PRN
Status: DISCONTINUED | OUTPATIENT
Start: 2019-09-23 | End: 2019-09-23 | Stop reason: SURG

## 2019-09-23 RX ORDER — CELECOXIB 200 MG/1
200 CAPSULE ORAL 2 TIMES DAILY
Status: DISCONTINUED | OUTPATIENT
Start: 2019-09-24 | End: 2019-09-26 | Stop reason: HOSPADM

## 2019-09-23 RX ORDER — MIDAZOLAM HYDROCHLORIDE 1 MG/ML
INJECTION INTRAMUSCULAR; INTRAVENOUS PRN
Status: DISCONTINUED | OUTPATIENT
Start: 2019-09-23 | End: 2019-09-23 | Stop reason: SURG

## 2019-09-23 RX ORDER — MORPHINE SULFATE 4 MG/ML
4 INJECTION, SOLUTION INTRAMUSCULAR; INTRAVENOUS
Status: DISCONTINUED | OUTPATIENT
Start: 2019-09-23 | End: 2019-09-26 | Stop reason: HOSPADM

## 2019-09-23 RX ORDER — HALOPERIDOL 5 MG/ML
1 INJECTION INTRAMUSCULAR EVERY 6 HOURS PRN
Status: DISCONTINUED | OUTPATIENT
Start: 2019-09-23 | End: 2019-09-26 | Stop reason: HOSPADM

## 2019-09-23 RX ORDER — ONDANSETRON 2 MG/ML
4 INJECTION INTRAMUSCULAR; INTRAVENOUS
Status: DISCONTINUED | OUTPATIENT
Start: 2019-09-23 | End: 2019-09-23 | Stop reason: HOSPADM

## 2019-09-23 RX ORDER — CELECOXIB 200 MG/1
400 CAPSULE ORAL
Status: COMPLETED | OUTPATIENT
Start: 2019-09-23 | End: 2019-09-23

## 2019-09-23 RX ORDER — KETOROLAC TROMETHAMINE 30 MG/ML
30 INJECTION, SOLUTION INTRAMUSCULAR; INTRAVENOUS EVERY 6 HOURS
Status: COMPLETED | OUTPATIENT
Start: 2019-09-23 | End: 2019-09-24

## 2019-09-23 RX ORDER — DIPHENHYDRAMINE HYDROCHLORIDE 50 MG/ML
25 INJECTION INTRAMUSCULAR; INTRAVENOUS EVERY 6 HOURS PRN
Status: DISCONTINUED | OUTPATIENT
Start: 2019-09-23 | End: 2019-09-26 | Stop reason: HOSPADM

## 2019-09-23 RX ORDER — ACETAMINOPHEN 500 MG
1000 TABLET ORAL EVERY 6 HOURS
Status: DISCONTINUED | OUTPATIENT
Start: 2019-09-23 | End: 2019-09-26 | Stop reason: HOSPADM

## 2019-09-23 RX ORDER — ONDANSETRON 2 MG/ML
INJECTION INTRAMUSCULAR; INTRAVENOUS PRN
Status: DISCONTINUED | OUTPATIENT
Start: 2019-09-23 | End: 2019-09-23 | Stop reason: SURG

## 2019-09-23 RX ORDER — LIDOCAINE HYDROCHLORIDE 20 MG/ML
INJECTION, SOLUTION EPIDURAL; INFILTRATION; INTRACAUDAL; PERINEURAL PRN
Status: DISCONTINUED | OUTPATIENT
Start: 2019-09-23 | End: 2019-09-23 | Stop reason: SURG

## 2019-09-23 RX ORDER — CEFAZOLIN SODIUM 1 G/3ML
INJECTION, POWDER, FOR SOLUTION INTRAMUSCULAR; INTRAVENOUS PRN
Status: DISCONTINUED | OUTPATIENT
Start: 2019-09-23 | End: 2019-09-23 | Stop reason: SURG

## 2019-09-23 RX ORDER — DEXAMETHASONE SODIUM PHOSPHATE 4 MG/ML
4 INJECTION, SOLUTION INTRA-ARTICULAR; INTRALESIONAL; INTRAMUSCULAR; INTRAVENOUS; SOFT TISSUE
Status: DISCONTINUED | OUTPATIENT
Start: 2019-09-23 | End: 2019-09-26 | Stop reason: HOSPADM

## 2019-09-23 RX ORDER — HYDROMORPHONE HYDROCHLORIDE 1 MG/ML
0.4 INJECTION, SOLUTION INTRAMUSCULAR; INTRAVENOUS; SUBCUTANEOUS
Status: DISCONTINUED | OUTPATIENT
Start: 2019-09-23 | End: 2019-09-23 | Stop reason: HOSPADM

## 2019-09-23 RX ORDER — SIMETHICONE 80 MG
80 TABLET,CHEWABLE ORAL EVERY 8 HOURS PRN
Status: DISCONTINUED | OUTPATIENT
Start: 2019-09-23 | End: 2019-09-26 | Stop reason: HOSPADM

## 2019-09-23 RX ORDER — ACETAMINOPHEN 500 MG
1000 TABLET ORAL
Status: COMPLETED | OUTPATIENT
Start: 2019-09-23 | End: 2019-09-23

## 2019-09-23 RX ORDER — MIDAZOLAM HYDROCHLORIDE 1 MG/ML
1 INJECTION INTRAMUSCULAR; INTRAVENOUS
Status: DISCONTINUED | OUTPATIENT
Start: 2019-09-23 | End: 2019-09-23 | Stop reason: HOSPADM

## 2019-09-23 RX ORDER — BUPIVACAINE HYDROCHLORIDE 2.5 MG/ML
INJECTION, SOLUTION EPIDURAL; INFILTRATION; INTRACAUDAL
Status: COMPLETED
Start: 2019-09-23 | End: 2019-09-23

## 2019-09-23 RX ADMIN — FENTANYL CITRATE 100 MCG: 50 INJECTION, SOLUTION INTRAMUSCULAR; INTRAVENOUS at 12:00

## 2019-09-23 RX ADMIN — ROCURONIUM BROMIDE 10 MG: 10 INJECTION, SOLUTION INTRAVENOUS at 10:27

## 2019-09-23 RX ADMIN — DOCUSATE SODIUM 100 MG: 100 CAPSULE, LIQUID FILLED ORAL at 17:44

## 2019-09-23 RX ADMIN — OXYCODONE HYDROCHLORIDE 10 MG: 5 SOLUTION ORAL at 14:41

## 2019-09-23 RX ADMIN — FENTANYL CITRATE 100 MCG: 50 INJECTION, SOLUTION INTRAMUSCULAR; INTRAVENOUS at 09:11

## 2019-09-23 RX ADMIN — HYDROMORPHONE HYDROCHLORIDE 0.4 MG: 1 INJECTION, SOLUTION INTRAMUSCULAR; INTRAVENOUS; SUBCUTANEOUS at 16:27

## 2019-09-23 RX ADMIN — SUGAMMADEX 200 MG: 100 INJECTION, SOLUTION INTRAVENOUS at 12:38

## 2019-09-23 RX ADMIN — HYDROMORPHONE HYDROCHLORIDE 0.2 MG: 1 INJECTION, SOLUTION INTRAMUSCULAR; INTRAVENOUS; SUBCUTANEOUS at 14:19

## 2019-09-23 RX ADMIN — PROPOFOL 130 MG: 10 INJECTION, EMULSION INTRAVENOUS at 09:11

## 2019-09-23 RX ADMIN — FENTANYL CITRATE 50 MCG: 50 INJECTION INTRAMUSCULAR; INTRAVENOUS at 13:22

## 2019-09-23 RX ADMIN — MORPHINE SULFATE 4 MG: 4 INJECTION INTRAVENOUS at 17:45

## 2019-09-23 RX ADMIN — SODIUM CHLORIDE, POTASSIUM CHLORIDE, SODIUM LACTATE AND CALCIUM CHLORIDE: 600; 310; 30; 20 INJECTION, SOLUTION INTRAVENOUS at 09:41

## 2019-09-23 RX ADMIN — ROCURONIUM BROMIDE 40 MG: 10 INJECTION, SOLUTION INTRAVENOUS at 09:11

## 2019-09-23 RX ADMIN — FENTANYL CITRATE 100 MCG: 50 INJECTION, SOLUTION INTRAMUSCULAR; INTRAVENOUS at 10:28

## 2019-09-23 RX ADMIN — FENTANYL CITRATE 50 MCG: 50 INJECTION INTRAMUSCULAR; INTRAVENOUS at 13:31

## 2019-09-23 RX ADMIN — HYDROMORPHONE HYDROCHLORIDE 0.2 MG: 1 INJECTION, SOLUTION INTRAMUSCULAR; INTRAVENOUS; SUBCUTANEOUS at 14:31

## 2019-09-23 RX ADMIN — INDIGO CARMINE 5 ML: 8 INJECTION, SOLUTION INTRAMUSCULAR; INTRAVENOUS at 12:09

## 2019-09-23 RX ADMIN — KETOROLAC TROMETHAMINE 30 MG: 30 INJECTION, SOLUTION INTRAMUSCULAR at 17:44

## 2019-09-23 RX ADMIN — DEXAMETHASONE SODIUM PHOSPHATE 4 MG: 4 INJECTION, SOLUTION INTRA-ARTICULAR; INTRALESIONAL; INTRAMUSCULAR; INTRAVENOUS; SOFT TISSUE at 09:11

## 2019-09-23 RX ADMIN — ROCURONIUM BROMIDE 10 MG: 10 INJECTION, SOLUTION INTRAVENOUS at 12:00

## 2019-09-23 RX ADMIN — SODIUM CHLORIDE, POTASSIUM CHLORIDE, SODIUM LACTATE AND CALCIUM CHLORIDE: 600; 310; 30; 20 INJECTION, SOLUTION INTRAVENOUS at 20:45

## 2019-09-23 RX ADMIN — CEFAZOLIN 2 G: 330 INJECTION, POWDER, FOR SOLUTION INTRAMUSCULAR; INTRAVENOUS at 09:11

## 2019-09-23 RX ADMIN — HYDROMORPHONE HYDROCHLORIDE 0.4 MG: 1 INJECTION, SOLUTION INTRAMUSCULAR; INTRAVENOUS; SUBCUTANEOUS at 14:05

## 2019-09-23 RX ADMIN — SODIUM CHLORIDE, POTASSIUM CHLORIDE, SODIUM LACTATE AND CALCIUM CHLORIDE: 600; 310; 30; 20 INJECTION, SOLUTION INTRAVENOUS at 08:48

## 2019-09-23 RX ADMIN — SODIUM CHLORIDE, POTASSIUM CHLORIDE, SODIUM LACTATE AND CALCIUM CHLORIDE: 600; 310; 30; 20 INJECTION, SOLUTION INTRAVENOUS at 11:59

## 2019-09-23 RX ADMIN — EPHEDRINE SULFATE 20 MG: 50 INJECTION INTRAMUSCULAR; INTRAVENOUS; SUBCUTANEOUS at 11:21

## 2019-09-23 RX ADMIN — HYDROMORPHONE HYDROCHLORIDE 0.4 MG: 1 INJECTION, SOLUTION INTRAMUSCULAR; INTRAVENOUS; SUBCUTANEOUS at 14:49

## 2019-09-23 RX ADMIN — SENNOSIDES, DOCUSATE SODIUM 1 TABLET: 50; 8.6 TABLET, FILM COATED ORAL at 20:47

## 2019-09-23 RX ADMIN — ACETAMINOPHEN 1000 MG: 500 TABLET ORAL at 17:44

## 2019-09-23 RX ADMIN — CELECOXIB 400 MG: 200 CAPSULE ORAL at 08:42

## 2019-09-23 RX ADMIN — OXYCODONE HYDROCHLORIDE 5 MG: 5 TABLET ORAL at 20:47

## 2019-09-23 RX ADMIN — ONDANSETRON 4 MG: 2 INJECTION INTRAMUSCULAR; INTRAVENOUS at 09:11

## 2019-09-23 RX ADMIN — HYDROMORPHONE HYDROCHLORIDE 0.4 MG: 1 INJECTION, SOLUTION INTRAMUSCULAR; INTRAVENOUS; SUBCUTANEOUS at 15:09

## 2019-09-23 RX ADMIN — SODIUM CHLORIDE, POTASSIUM CHLORIDE, SODIUM LACTATE AND CALCIUM CHLORIDE: 600; 310; 30; 20 INJECTION, SOLUTION INTRAVENOUS at 13:25

## 2019-09-23 RX ADMIN — MIDAZOLAM 2 MG: 1 INJECTION INTRAMUSCULAR; INTRAVENOUS at 09:11

## 2019-09-23 RX ADMIN — ACETAMINOPHEN 1000 MG: 500 TABLET ORAL at 08:42

## 2019-09-23 RX ADMIN — HYDROMORPHONE HYDROCHLORIDE 0.4 MG: 1 INJECTION, SOLUTION INTRAMUSCULAR; INTRAVENOUS; SUBCUTANEOUS at 13:44

## 2019-09-23 RX ADMIN — LIDOCAINE HYDROCHLORIDE 100 MG: 20 INJECTION, SOLUTION EPIDURAL; INFILTRATION; INTRACAUDAL at 09:11

## 2019-09-23 ASSESSMENT — LIFESTYLE VARIABLES
TOTAL SCORE: 0
EVER HAD A DRINK FIRST THING IN THE MORNING TO STEADY YOUR NERVES TO GET RID OF A HANGOVER: NO
AVERAGE NUMBER OF DAYS PER WEEK YOU HAVE A DRINK CONTAINING ALCOHOL: 0
HOW MANY TIMES IN THE PAST YEAR HAVE YOU HAD 5 OR MORE DRINKS IN A DAY: 0
ON A TYPICAL DAY WHEN YOU DRINK ALCOHOL HOW MANY DRINKS DO YOU HAVE: 0
DOES PATIENT WANT TO STOP DRINKING: NO
HAVE PEOPLE ANNOYED YOU BY CRITICIZING YOUR DRINKING: NO
ALCOHOL_USE: YES
CONSUMPTION TOTAL: NEGATIVE
HAVE YOU EVER FELT YOU SHOULD CUT DOWN ON YOUR DRINKING: NO
TOTAL SCORE: 0
EVER_SMOKED: NEVER
TOTAL SCORE: 0
EVER FELT BAD OR GUILTY ABOUT YOUR DRINKING: NO

## 2019-09-23 ASSESSMENT — PATIENT HEALTH QUESTIONNAIRE - PHQ9
SUM OF ALL RESPONSES TO PHQ9 QUESTIONS 1 AND 2: 0
2. FEELING DOWN, DEPRESSED, IRRITABLE, OR HOPELESS: NOT AT ALL
1. LITTLE INTEREST OR PLEASURE IN DOING THINGS: NOT AT ALL

## 2019-09-23 ASSESSMENT — PAIN SCALES - GENERAL: PAIN_LEVEL: 7

## 2019-09-23 NOTE — OP REPORT
DATE OF SERVICE:  09/23/2019    PREOPERATIVE DIAGNOSIS:  Possible rectal injury.    POSTOPERATIVE DIAGNOSIS:  Serosal injury to the rectum.    PROCEDURE:  Repair of rectal serosal injury and rigid sigmoidoscopy.    SURGEON:  Veronica Ayala MD    ASSISTANT:  Jaimie Espinoza MD    ANESTHESIA:  General endotracheal.    ANESTHESIOLOGIST:  Sukhwinder Moya MD    INDICATIONS:  The patient is a 34-year-old female who Dr. Espinoza is doing   a laparoscopic, now converted to an open hysterectomy on due to severe   endometriosis.  There was concern for possible rectal injury and was brought   to the operating room to evaluate.    FINDINGS:  Approximately 4 cm serosal tear to the anterior wall of the rectum.    There was not an obvious penetration of the mucosa.  The remaining part of   the back wall of the vagina was  from the rectum.  A primary repair   with 3-0 silks was performed.  A rigid sigmoidoscopy was performed.  There was   no evidence of an air leak.    DESCRIPTION OF PROCEDURE:  After consultation, the colon was evaluated, the   aforementioned findings were noted.  The repair was completed with 3-0 silks.    Once the rectum had been  from the vagina sharply, we did place a   sponge stick in the vaginal vault to confirm the 2 organs and then   subsequently a rigid sigmoidoscopy was performed.  There was no evidence of an   air leak.  A drain was left in the pelvis.  A separate dictation will be   provided by Dr. Espinoza in regards to the other findings.       ____________________________________     VERONICA AYALA MD    [facility] / [facility]    DD:  09/23/2019 12:40:24  DT:  09/23/2019 12:47:38    D#:  [id number]  Job#:  [id number]

## 2019-09-23 NOTE — CONSULTS
DATE OF SERVICE:  09/23/2019    INTRAOPERATIVE SURGICAL CONSULTATION    PHYSICIAN REQUESTING CONSULTATION:  Jaimie Espinoza MD    REASON FOR CONSULTATION:  The patient is a 34-year-old female who was brought   to the operating room by Dr. Espinoza for a hysterectomy.  She subsequently   intraoperatively, became a difficult laparoscopic hysterectomy and was   converted to an open procedure due to significant endometriosis.  There was a   concern of whether or not there was an injury to the rectum.  I was asked to   do an intraoperative consultation to evaluate.    PAST MEDICAL HISTORY:  Depression and GI issues.    PAST SURGICAL HISTORY:  [place] teeth extraction.    MEDICATIONS:  Citalopram.    ALLERGIES:  DARVOCET.    SOCIAL HISTORY:  She is .  She does not drink.    REVIEW OF SYSTEMS:  Unobtainable.    PHYSICAL EXAMINATION:  Patient is under general anesthesia.  Her abdomen was   opened through a Pfannenstiel incision.  The uterus has already been removed.    The rectum was evaluated and there was a serosal tear of the anterior wall of   the rectum.  There was possible concern for there to be injury of the colon   and therefore repair will be performed as well as sigmoidoscopy.    IMPRESSION:  A 34-year-old female with possible intraoperative rectal injury   during hysterectomy.    PLAN:  To do a direct repair as well as a rigid sigmoidoscopy.       ____________________________________     ARVIN HICKMAN MD    [facility] / [facility]    DD:  09/23/2019 12:38:17  DT:  09/23/2019 12:49:44    D#:  [id number]  Job#:  [id number]

## 2019-09-23 NOTE — ANESTHESIA TIME REPORT
Anesthesia Start and Stop Event Times     Date Time Event    9/23/2019 0904 Anesthesia Start     1300 Anesthesia Stop        Responsible Staff  09/23/19    Name Role Begin End    Sukhwinder Moya M.D. Anesth 0904 1300        Preop Diagnosis (Free Text):  Pre-op Diagnosis     CHRONIC PELVIC PAIN, DYSMENORRHEA, MENORRHAGIA        Preop Diagnosis (Codes):    Post op Diagnosis  Chronic pelvic pain in female      Premium Reason  Non-Premium    Comments:

## 2019-09-23 NOTE — ANESTHESIA POSTPROCEDURE EVALUATION
Patient: Janel Benton    Procedure Summary     Date:  09/23/19 Room / Location:  [facility] ROOM 24 / SURGERY SAME DAY [facility]    Anesthesia Start:  0904 Anesthesia Stop:  1300    Procedures:       HYSTERECTOMY, LAPAROSCOPIC CONVERTED TO OPEN AT 1015 (Abdomen)      SALPINGECTOMY (Bilateral Abdomen)      EXCISION, CYST, OVARY (Abdomen)      OOPHORECTOMY- (Left Abdomen)      HYSTERECTOMY, TOTAL, ABDOMINAL CONVERTED TO OPEN AT 1015, REPAIR RECTAL SEROUSAL INJURY AND RIGID SIGMOIDOSCOPY PERFORMED BY DR. HICKMAN (Abdomen)      CYSTOSCOPY (Bladder) Diagnosis:  (CHRONIC PELVIC PAIN, DYSMENORRHEA, MENORRHAGIA)    Surgeon:  Jaimie Espinoza M.D. Responsible Provider:  Sukhwinder Moya M.D.    Anesthesia Type:  general ASA Status:  2          Final Anesthesia Type: general  Last vitals  BP   Blood Pressure: 145/80    Temp   37.1 °C (98.7 °F)    Pulse   Pulse: (!) 120   Resp   12    SpO2   100 %      Anesthesia Post Evaluation    Patient location during evaluation: PACU  Patient participation: complete - patient participated  Level of consciousness: awake and alert  Pain score: 7 (subjective experience)    Airway patency: patent  Anesthetic complications: no  Cardiovascular status: hemodynamically stable  Respiratory status: acceptable  Hydration status: euvolemic    PONV: none           Nurse Pain Score: 8 (NPRS)

## 2019-09-23 NOTE — OP REPORT
DATE OF SERVICE:  09/23/2019    PREOPERATIVE DIAGNOSES:  1.  Pelvic pain.  2.  Leiomyoma of uterus.  3.  Dysmenorrhea.  4.  Dyspareunia.  5.  Bilateral ovarian cysts.    POSTOPERATIVE DIAGNOSES:  1.  Pelvic pain.  2.  Leiomyoma of uterus.  3.  Dysmenorrhea.  4.  Dyspareunia.  5.  Bilateral ovarian cysts.  6.  Severe endometriosis of pelvis.  7.  Pelvic adhesive disease.    PROCEDURES PERFORMED:  1.  Abdominal laparoscopy converted to open procedure.  2.  Total abdominal hysterectomy.  3.  Right salpingectomy.  4.  Left salpingo-oophorectomy.  5.  Extensive lysis of adhesions.  6.  Cystoscopy.  7.  Rectal serosal tear repair performed by Dr. Veronica Ayala.    SURGEON:  Jaimie Espinoza MD    ASSISTANT:  Thania Hernandez MD    INTRAOPERATIVE CONSULTATION:  With Veronica Ayala MD    ANESTHESIA:  General.    ANESTHESIOLOGIST:  Sukhwinder Moya MD    SPECIMENS:  Cervix, uterus, right fallopian tube, left tube and ovary.    ESTIMATED BLOOD LOSS:  850 mL.    COMPLICATIONS:  None.    FINDINGS:  Severe pelvic adhesive disease noted.  Severe endometriosis.    Uterus was enlarged due to an intramural leiomyoma.  The fallopian tubes were   edematous and adhered to the ovaries and pelvic sidewall.  The left ovary was   enlarged with multiple chocolate cysts.  The left ovary was adhered to the   posterior uterus and to the left pelvic sidewall.  The right ovary was normal   in size, but adhered to the posterior uterus and the right pelvic sidewall.    The rectum was densely adhered to the posterior wall of the uterus.  The   posterior cul-de-sac was obliterated due to the adhesive disease and   endometriosis implant was noted on the appendix.    DESCRIPTION OF PROCEDURE:  After appropriate consents were obtained, the   patient was taken to the operating room where general anesthesia was performed   without difficulty.  The patient was then prepped and draped in dorsal   lithotomy position in the [facility] sterile  fashion was performed.    A Hinkle catheter was placed in the bladder.  A metal bivalve speculum was   placed in the vagina.  The anterior lip of the cervix was grasped with a   tenaculum.  The cervix was sounded to a depth of 9 cm.  A large VCare uterine   manipulator was obtained and advanced through the cervix to the fundus of the   uterus.  The VCare uterine manipulator was placed without difficulty.  The   tenaculum was removed from the cervix and the speculum was removed from the   vagina.  The VCare was placed in the vagina in its appropriate location.    Gloves were changed and then attention was turned to the patient's abdomen.  A   0.25% Marcaine with epinephrine was injected into the umbilicus.  A 5 mm   incision was then made in the umbilicus with a scalpel.  The 5 mm trocar was   advanced over the camera and the trocar and camera were advanced into the   abdominal cavity under direct laparoscopic visualization.  Gas was then   insufflated to not exceed a pressure of 15 mmHg and intra-abdominal placement   was once again confirmed.  The patient was then placed in Trendelenburg   position.  Two more ports were placed, one in the right lower quadrant and one   in the left lower quadrant.  They were both placed in the exact same fashion.    A 5 mm was placed on the right and an 11 mm on the left.  The skin was   injected with Marcaine on these sites.  The incisions were made with the   scalpel and the trocars were advanced under direct laparoscopic visualization.    Examination of the pelvis then occurred.  We attempted to perform the   procedure laparoscopically.  The uterus was elevated and the extensive   adhesions were noted.  We attempted to bluntly dissect the adhesions off of   the posterior wall of the uterus.  The rectum was noted to be densely adhered.    We used laparoscopic peanuts and gently in a blunt fashion began to take   down the adhesions.  The adhesions were so dense and there was  difficulty in   identifying normal tissue from abnormal tissue that we decided to discontinue   the laparoscopy and perform a laparotomy/total abdominal hysterectomy for   better visualization and safety for the patient.  The team began to get the   laparotomy instruments ready.  We removed the gas from the abdomen and   flattened her out.  The trocars were removed.  The incision sites were all   reapproximated with 4-0 Monocryl suture in subcuticular fashion.  This then   began the laparotomy portion of the case.  A Pfannenstiel incision was made 3   cm superior to the pubic symphysis with a scalpel.  It was carried down to the   underlying fascia using the Bovie.  The Bovie was used to incise the fascia   in the midline.  The fascial incisions were extended laterally using the   tissue forceps and curved Madrid scissors.  The superior and inferior of the   fascia were both grasped with Kocher clamps, tented up, and the underlying   rectus muscles were dissected off bluntly and with the Bovie cautery.  The   peritoneum was entered bluntly.  The peritoneal opening was extended   cephalocaudally.  The pelvis was again examined and the findings were as noted   above.  A retractor was placed in the abdomen for visualization.  The bowel   was packed away with moist laparotomy sponges.  Clamps were placed on the   cornua and were used for retraction.  We first identified the round ligaments   on both sides, grasped them with Kocher clamps, transected the round ligaments   and suture ligated with #0 Vicryl suture.  Then, the anterior leaf of the   broad ligaments on both sides were incised along the bladder reflection to the   midline.  The bladder was then gently dissected off in a blunt fashion off   the lower uterine segment to create a bladder flap.  There were no injuries   noted to the bladder.  The fallopian tube on the right was grasped with   pickups and excised using the LigaSure cautery device.  This was handed  off   for pathology specimen.  We bluntly dissected the rectum off the posterior   aspect of the uterus and it was noted that there was a small serosal tear.    This was evaluated and repaired by general surgery at a later time.  The   uteroovarian ligaments on both sides were identified and then doubly clamped,   transected, and suture ligated with #0 Vicryl suture.  There was hemostasis   noted there.  The uterine arteries on both sides were skeletonized   bilaterally.  They were clamped with Girish clamps and transected and suture   ligated with #0 Vicryl suture.  During this time, we were also bluntly   dissecting the ovaries off the posterior surface of the uterus to facilitate   visualization of the lateral edges of the uterus.  The uterosacral ligaments   were clamped with Girish clamps, transected, and also suture ligated with #0   Vicryl suture.  We first then amputated the uterus off of the cervix to   facilitate for better visualization during the case.  The left ureter was   clearly visualized during the case, but the right ureter was unable to be   visualized due to the extensive scar tissue that the patient had on the right   side.  We were unable to visualize the ureter during the abdominal portion of   the case.  As I dictate later, there was a cystoscopy performed to evaluate   the right ureter.  Once the uterus was removed, then we proceeded down using   the Girish clamps to the cardinal ligaments.  These were clamped, transected,   and suture ligated with #0 Vicryl suture.  The cervix was then amputated using   the Luis scissors.  The cervix was removed in its entirety.  The vaginal   cuff angles were closed with figure-of-eight stitches of #0 Vicryl suture.    The remainder of the vaginal cuff was then reapproximated with #0 Vicryl   suture in a running fashion.  There was some area of oozing on the right   uterine artery region at the level of the vagina that required surgical clips   in  order to provide hemostasis.  Once the vaginal cuff was closed, the pelvis   was irrigated copiously with warm normal saline.  There were no other visible   areas of bleeding.  The left tube and ovary were then dissected off the pelvic   sidewall.  The left infundibulopelvic ligament was identified, doubly clamped   with Girish clamps.  The left tube and ovary were transected and sent for   pathology specimen.  The left IP ligament was doubly suture ligated with #0   Vicryl suture.  Excellent hemostasis was noted.  At this time, due to the   rectal serosal tear, we called Dr. Veronica Ayala for intraoperative   consultation.  Please see her dictation for her portion of the procedure.    Once she finished reapproximating the rectal serosal tear, we irrigated the   pelvis once again and hemostasis was visualized.  A ENRIQUE drain was placed in the   pelvis between the posterior cul-de-sac and the rectum.  The drain was placed   in the right lower quadrant of the abdomen.  We then removed the retractor   and all laparotomy sponges and instruments were removed from the abdomen.  We   then reapproximated the peritoneum with 3-0 Vicryl suture, running.  The   fascia was closed with 1-0 Vicryl suture, running.  The subcutaneous fat was   reapproximated with 3-0 Vicryl suture, interrupted.  Hemostasis was noted.    The skin was closed using subcuticular Monocryl 4-0 suture.  A Mepilex   dressing was placed there.  The laparoscopy incisions were closed with the 4-0   Monocryl suture and Dermabond.  The right lower quadrant port site had the   drain tubing that was placed there and a silk suture was placed in order to   hold the drain in place.  I then proceeded to remove the Hinkle catheter.    Anesthesia had previously given indigo carmine blue dye.  The Hinkle was   removed.  A cystoscope was obtained.  Cystoscopy was then performed.  Both   ureters were noted to have blue dye jets on multiple instances.  The   cystoscope was  removed and the Hinkle catheter was placed back in the bladder   and this was completion of our procedure.  The patient tolerated the procedure   well.  Sponge, lap and needle counts were correct x2 and she was taken to   recovery in a stable condition.  This was a technically difficult case.  The   start was at 9:30 a.m. and ended at approximately 12:40 p.m. taking 3 hours   and 10 minutes.  The case was technically difficult due to the severe adhesive   disease and endometriosis.       ____________________________________     MD YURIDIA Patel / KEERTHI    DD:  09/23/2019 13:27:00  DT:  09/23/2019 14:33:53    D#:  [id number]  Job#:  [id number]

## 2019-09-23 NOTE — ANESTHESIA PROCEDURE NOTES
Airway  Date/Time: 9/23/2019 9:12 AM  Performed by: Sukhwinder Moya M.D.  Authorized by: Sukhwinder Moya M.D.     Location:  OR  Urgency:  Elective  Indications for Airway Management:  Anesthesia  Spontaneous Ventilation: absent    Sedation Level:  Deep  Preoxygenated: Yes    Patient Position:  Sniffing  Final Airway Type:  Endotracheal airway  Final Endotracheal Airway:  ETT  Cuffed: Yes    Technique Used for Successful ETT Placement:  Direct laryngoscopy  Insertion Site:  Oral  Blade Type:  Lennon  Laryngoscope Blade/Videolaryngoscope Blade Size:  2  ETT Size (mm):  7.0  Measured from:  Teeth  ETT to Teeth (cm):  22  Placement Verified by: auscultation and capnometry    Cormack-Lehane Classification:  Grade I - full view of glottis  Number of Attempts at Approach:  1

## 2019-09-23 NOTE — OR NURSING
1249-Received via [facility] from OR.  RR even, unlabored.  Abdominal dressings C/D/I x3.  Hinkle to gravity, blue color.  ENRIQUE drain x1  , pre-op 99  CWG=996eo.  Dr Moya aware    1300-More alert, but sleeps if undisturbed    1315-High flow oxymask on.  Family in to visit    1325-Medicated for CO pain 8-9/10.    1330-Dr Liu in to check on patient.  Medicated for CO pain    1335-Lab in to draw H & H    1349-Handoff report to Queenie AUGUSTINE    1430-Handoff report from Queenie AUGUSTINE    1445-Medicated PO for CO pain 8/10    1500-Medicated For CO pain-7/10.  States starting to be more tolerable    1530-Report called to Leidy AUGUSTINE.  Aware of increased HR, will call Dr Liu to order tele box monitoring.  Waiting for bed to be brought to room    1545-Bed board called, awaiting call back    1550-Bed to room, transport requested    1600-Resting    1630-Awaiting transport    1648-DC to room 428-1 via [facility], with transport & .  States comfort

## 2019-09-23 NOTE — OR NURSING
1349 Report received from Fariha AUGUSTINE. Family at bedside. Remains tachycardic. Family asked to limit visitation to 1 person sitting quietly, allowing pt to rest.     1405 Remedicated per MAR. Spouse at bedside. Sitting quietly.     1419 Remedicated for 7-8/10 pain per MAR.     1431 Remedicated, Report back to Fariha AUGUSTINE.

## 2019-09-23 NOTE — OR SURGEON
Immediate Post OP Note    PreOp Diagnosis:   Pelvic pain  Leiomyoma  Dysmenorrhea  Dyspareunia  Bilateral ovarian cysts    PostOp Diagnosis:   Same  Severe endometriosis of pelvis  Pelvic adhesive disease    Procedure(s):  HYSTERECTOMY, LAPAROSCOPIC CONVERTED TO OPEN AT 1015 - Wound Class: Clean Contaminated  SALPINGECTOMY (bilateral) - Wound Class: Clean Contaminated  EXCISION, CYST, OVARY - Wound Class: Clean Contaminated  Left OOPHORECTOMY- - Wound Class: Clean Contaminated  HYSTERECTOMY, TOTAL, ABDOMINAL CONVERTED TO OPEN AT 1015, REPAIR RECTAL SEROUSAL INJURY AND RIGID SIGMOIDOSCOPY PERFORMED BY DR. HICKMAN - Wound Class: Clean Contaminated  CYSTOSCOPY - Wound Class: Clean Contaminated    Surgeon(s):  NIRMALA aPrra M.D. Frieda M Hulka, M.D.    Anesthesiologist/Type of Anesthesia:  Anesthesiologist: Sukhwinder Moya M.D./General    Surgical Staff:  Circulator: Suha Plascencia R.N.  Relief Circulator: Alicia Horan R.N.  Scrub Person: Mayela Frank    Specimens removed if any:  ID Type Source Tests Collected by Time Destination   A : UTERUS, CERVIX, BILATERAL FALLOPIAN TUBES, LEFT OVARY  Tissue Abdominal PATHOLOGY SPECIMEN Jaimie Espinoza M.D. 9/23/2019 12:33 PM        Estimated Blood Loss: 850 mL    Findings: severe pelvic adhesive disease, severe endometriosis, uterus enlarged, bilateral ovaries adhered to posterior uterus and pelvic side walls, rectum adhered to the posterior uterus, posterior cul de sac obliterated by adhesions, endometriosis implant on appendix    Complications: none        9/23/2019 1:06 PM Jaimie Espinoza M.D.

## 2019-09-23 NOTE — ANESTHESIA QCDR
2019 [facility] Clinical Data Registry (for Quality Improvement)     Postoperative nausea/vomiting risk protocol (Adult = 18 yrs and Pediatric 3-17 yrs)- (430 and 463)  General inhalation anesthetic (NOT TIVA) with PONV risk factors: Yes  Provision of anti-emetic therapy with at least 2 different classes of agents: Yes   Patient DID NOT receive anti-emetic therapy and reason is documented in Medical Record:  N/A    Multimodal Pain Management- (AQI59)  Patient undergoing Elective Surgery (i.e. Outpatient, or ASC, or Prescheduled Surgery prior to Hospital Admission): Yes  Use of Multimodal Pain Management, two or more drugs and/or interventions, NOT including systemic opioids: Yes   Exception: Documented allergy to multiple classes of analgesics:  N/A    PACU assessment of acute postoperative pain prior to Anesthesia Care End- Applies to Patients Age = 18- (ABG7)  Initial PACU pain score is which of the following: < 7/10  Patient unable to report pain score: N/A    Post-anesthetic transfer of care checklist/protocol to PACU/ICU- (426 and 427)  Upon conclusion of case, patient transferred to which of the following locations: PACU/Non-ICU  Use of transfer checklist/protocol: Yes  Exclusion: Service Performed in Patient Hospital Room (and thus did not require transfer): N/A    PACU Reintubation- (AQI31)  General anesthesia requiring endotracheal intubation (ETT) along with subsequent extubation in OR or PACU: No  Required reintubation in the PACU: N/A  Extubation was a planned trial documented in the medical record prior to removal of the original airway device: N/A    Unplanned admission to ICU related to anesthesia service up through end of PACU care- (MD51)  Unplanned admission to ICU (not initially anticipated at anesthesia start time): No

## 2019-09-24 LAB
ANION GAP SERPL CALC-SCNC: 7 MMOL/L (ref 0–11.9)
ANISOCYTOSIS BLD QL SMEAR: ABNORMAL
BASOPHILS # BLD AUTO: 0.3 % (ref 0–1.8)
BASOPHILS # BLD: 0.03 K/UL (ref 0–0.12)
BUN SERPL-MCNC: 11 MG/DL (ref 8–22)
CALCIUM SERPL-MCNC: 8.3 MG/DL (ref 8.5–10.5)
CHLORIDE SERPL-SCNC: 104 MMOL/L (ref 96–112)
CO2 SERPL-SCNC: 27 MMOL/L (ref 20–33)
COMMENT 1642: NORMAL
CREAT SERPL-MCNC: 0.79 MG/DL (ref 0.5–1.4)
EOSINOPHIL # BLD AUTO: 0.02 K/UL (ref 0–0.51)
EOSINOPHIL NFR BLD: 0.2 % (ref 0–6.9)
ERYTHROCYTE [DISTWIDTH] IN BLOOD BY AUTOMATED COUNT: 52 FL (ref 35.9–50)
GLUCOSE SERPL-MCNC: 130 MG/DL (ref 65–99)
HCT VFR BLD AUTO: 31.2 % (ref 37–47)
HGB BLD-MCNC: 9 G/DL (ref 12–16)
IMM GRANULOCYTES # BLD AUTO: 0.03 K/UL (ref 0–0.11)
IMM GRANULOCYTES NFR BLD AUTO: 0.3 % (ref 0–0.9)
LYMPHOCYTES # BLD AUTO: 2.1 K/UL (ref 1–4.8)
LYMPHOCYTES NFR BLD: 19.2 % (ref 22–41)
MCH RBC QN AUTO: 23.5 PG (ref 27–33)
MCHC RBC AUTO-ENTMCNC: 28.8 G/DL (ref 33.6–35)
MCV RBC AUTO: 81.5 FL (ref 81.4–97.8)
MICROCYTES BLD QL SMEAR: ABNORMAL
MONOCYTES # BLD AUTO: 0.94 K/UL (ref 0–0.85)
MONOCYTES NFR BLD AUTO: 8.6 % (ref 0–13.4)
MORPHOLOGY BLD-IMP: NORMAL
NEUTROPHILS # BLD AUTO: 7.83 K/UL (ref 2–7.15)
NEUTROPHILS NFR BLD: 71.4 % (ref 44–72)
NRBC # BLD AUTO: 0 K/UL
NRBC BLD-RTO: 0 /100 WBC
PLATELET # BLD AUTO: 436 K/UL (ref 164–446)
PLATELET BLD QL SMEAR: NORMAL
PMV BLD AUTO: 9.2 FL (ref 9–12.9)
POTASSIUM SERPL-SCNC: 4.3 MMOL/L (ref 3.6–5.5)
RBC # BLD AUTO: 3.83 M/UL (ref 4.2–5.4)
RBC BLD AUTO: PRESENT
SMUDGE CELLS BLD QL SMEAR: NORMAL
SODIUM SERPL-SCNC: 138 MMOL/L (ref 135–145)
WBC # BLD AUTO: 11 K/UL (ref 4.8–10.8)

## 2019-09-24 PROCEDURE — 770020 HCHG ROOM/CARE - TELE (206)

## 2019-09-24 PROCEDURE — 700112 HCHG RX REV CODE 229: Performed by: OBSTETRICS & GYNECOLOGY

## 2019-09-24 PROCEDURE — 700111 HCHG RX REV CODE 636 W/ 250 OVERRIDE (IP): Performed by: OBSTETRICS & GYNECOLOGY

## 2019-09-24 PROCEDURE — 80048 BASIC METABOLIC PNL TOTAL CA: CPT

## 2019-09-24 PROCEDURE — A9270 NON-COVERED ITEM OR SERVICE: HCPCS | Performed by: OBSTETRICS & GYNECOLOGY

## 2019-09-24 PROCEDURE — 700102 HCHG RX REV CODE 250 W/ 637 OVERRIDE(OP): Performed by: OBSTETRICS & GYNECOLOGY

## 2019-09-24 PROCEDURE — 36415 COLL VENOUS BLD VENIPUNCTURE: CPT

## 2019-09-24 PROCEDURE — 85025 COMPLETE CBC W/AUTO DIFF WBC: CPT

## 2019-09-24 RX ORDER — CITALOPRAM 40 MG/1
30 TABLET ORAL DAILY
Status: DISCONTINUED | OUTPATIENT
Start: 2019-09-25 | End: 2019-09-24

## 2019-09-24 RX ORDER — CITALOPRAM 40 MG/1
30 TABLET ORAL DAILY
Status: DISCONTINUED | OUTPATIENT
Start: 2019-09-24 | End: 2019-09-26 | Stop reason: HOSPADM

## 2019-09-24 RX ADMIN — ACETAMINOPHEN 1000 MG: 500 TABLET ORAL at 00:14

## 2019-09-24 RX ADMIN — CITALOPRAM HYDROBROMIDE 30 MG: 40 TABLET ORAL at 21:01

## 2019-09-24 RX ADMIN — DOCUSATE SODIUM 100 MG: 100 CAPSULE, LIQUID FILLED ORAL at 17:20

## 2019-09-24 RX ADMIN — SENNOSIDES, DOCUSATE SODIUM 1 TABLET: 50; 8.6 TABLET, FILM COATED ORAL at 20:06

## 2019-09-24 RX ADMIN — CELECOXIB 200 MG: 200 CAPSULE ORAL at 17:21

## 2019-09-24 RX ADMIN — KETOROLAC TROMETHAMINE 30 MG: 30 INJECTION, SOLUTION INTRAMUSCULAR at 06:28

## 2019-09-24 RX ADMIN — OXYCODONE HYDROCHLORIDE 5 MG: 5 TABLET ORAL at 20:06

## 2019-09-24 RX ADMIN — ACETAMINOPHEN 1000 MG: 500 TABLET ORAL at 11:37

## 2019-09-24 RX ADMIN — DOCUSATE SODIUM 100 MG: 100 CAPSULE, LIQUID FILLED ORAL at 06:28

## 2019-09-24 RX ADMIN — ACETAMINOPHEN 1000 MG: 500 TABLET ORAL at 06:28

## 2019-09-24 RX ADMIN — KETOROLAC TROMETHAMINE 30 MG: 30 INJECTION, SOLUTION INTRAMUSCULAR at 11:37

## 2019-09-24 RX ADMIN — ACETAMINOPHEN 1000 MG: 500 TABLET ORAL at 17:21

## 2019-09-24 RX ADMIN — KETOROLAC TROMETHAMINE 30 MG: 30 INJECTION, SOLUTION INTRAMUSCULAR at 00:15

## 2019-09-24 ASSESSMENT — COGNITIVE AND FUNCTIONAL STATUS - GENERAL
DRESSING REGULAR LOWER BODY CLOTHING: A LITTLE
MOVING TO AND FROM BED TO CHAIR: A LITTLE
MOBILITY SCORE: 21
DAILY ACTIVITIY SCORE: 23
TURNING FROM BACK TO SIDE WHILE IN FLAT BAD: A LITTLE
SUGGESTED CMS G CODE MODIFIER DAILY ACTIVITY: CI
MOVING FROM LYING ON BACK TO SITTING ON SIDE OF FLAT BED: A LITTLE
SUGGESTED CMS G CODE MODIFIER MOBILITY: CJ

## 2019-09-24 NOTE — DIETARY
NUTRITION SERVICES: BMI - Pt with BMI >40 (=Body mass index is 40.74 kg/m².), morbid obesity. Weight loss counseling not appropriate in acute care setting.     RECOMMEND - Referral to outpatient nutrition services for weight management after D/C.

## 2019-09-24 NOTE — DISCHARGE PLANNING
Care Transition Team Assessment    Assessment Complete.  The patient verified the accuracy of the demographic information in the Facesheet.  The patient is admitted for a Hysterectomy.  The patient indicates she anticipates returning home with no additional needs.     Information Source  Orientation : Oriented x 4  Information Given By: Patient  Informant's Name: (Janel)  Who is responsible for making decisions for patient? : Patient    Readmission Evaluation  Is this a readmission?: No    Elopement Risk  Legal Hold: No  Ambulatory or Self Mobile in Wheelchair: Yes  Disoriented: No  Psychiatric Symptoms: None  History of Wandering: No  Elopement this Admit: No  Vocalizing Wanting to Leave: No  Displays Behaviors, Body Language Wanting to Leave: No-Not at Risk for Elopement  Elopement Risk: Not at Risk for Elopement    Interdisciplinary Discharge Planning  Patient or legal guardian wants to designate a caregiver (see row info): No    Discharge Preparedness  What is your plan after discharge?: Home with help  What are your discharge supports?: Spouse  Prior Functional Level: Independent with Activities of Daily Living    Functional Assesment  Prior Functional Level: Independent with Activities of Daily Living    Finances  Financial Barriers to Discharge: No  Prescription Coverage: Yes    Vision / Hearing Impairment  Vision Impairment : No  Hearing Impairment : No         Advance Directive  Advance Directive?: None  Advance Directive offered?: AD Booklet refused    Domestic Abuse  Have you ever been the victim of abuse or violence?: No  Physical Abuse or Sexual Abuse: No  Verbal Abuse or Emotional Abuse: No  Possible Abuse Reported to:: Not Applicable    Psychological Assessment  History of Substance Abuse: None  History of Psychiatric Problems: No    Discharge Risks or Barriers  Discharge risks or barriers?: No    Anticipated Discharge Information  Anticipated discharge disposition: Home

## 2019-09-24 NOTE — PROGRESS NOTES
2 RN skin check complete    Sacrum red and blanching  Red area on top side of right foot blanching  Hinkle in place, stat lock to left upper thigh.  ENRIQUE drain to RLQ.  Lateral surgical incision below umbilicus.  All other bony prominences intact

## 2019-09-24 NOTE — PROGRESS NOTES
Bedside report received.  Assessment complete.  A&O x 4. Patient calls appropriately.  Patient ambulatory with sb assist.   Patient has 6/10 pain. Medicated per MAR  Denies N&V. Tolerating full liquid diet.  Surgical sites assessed covered in dressing CDI .  olivier void, - flatus, pta BM.  Patient denies SOB.  SCD's off.  Review plan with of care with patient. Call light and personal belongings with in reach. Hourly rounding in place. All needs met at this time.

## 2019-09-24 NOTE — PROGRESS NOTES
"GYNECOLOGY POSTOP PROGRESS NOTE    S: Pt doing well, pain controlled, olivier removed this a.m., has not voided yet, has not ambulated, no flatus, no f/c, no HA, no dizziness    O:   /74   Pulse 100   Temp 36.4 °C (97.5 °F) (Temporal)   Resp 17   Ht 1.676 m (5' 6\")   Wt 114.5 kg (252 lb 6.8 oz)   LMP 09/09/2019 (Approximate) Comment: neg dos  SpO2 92%   BMI 40.74 kg/m²   Gen - NAD, comfortable  Abd - soft, nondistended, no rebound or guarding, hypoactive bowel sounds, appropriately tender  Ext - NT, no C/C/E    Lab Results   Component Value Date/Time    WBC 11.0 (H) 09/24/2019 04:00 AM    RBC 3.83 (L) 09/24/2019 04:00 AM    HEMOGLOBIN 9.0 (L) 09/24/2019 04:00 AM    HEMATOCRIT 31.2 (L) 09/24/2019 04:00 AM    MCV 81.5 09/24/2019 04:00 AM    MCH 23.5 (L) 09/24/2019 04:00 AM    MCHC 28.8 (L) 09/24/2019 04:00 AM    MPV 9.2 09/24/2019 04:00 AM    NEUTSPOLYS 71.40 09/24/2019 04:00 AM    LYMPHOCYTES 19.20 (L) 09/24/2019 04:00 AM    MONOCYTES 8.60 09/24/2019 04:00 AM    EOSINOPHILS 0.20 09/24/2019 04:00 AM    BASOPHILS 0.30 09/24/2019 04:00 AM    HYPOCHROMIA 1+ 12/10/2013 11:40 AM    ANISOCYTOSIS 1+ 09/24/2019 04:00 AM      Lab Results   Component Value Date/Time    SODIUM 138 09/24/2019 04:00 AM    POTASSIUM 4.3 09/24/2019 04:00 AM    CHLORIDE 104 09/24/2019 04:00 AM    CO2 27 09/24/2019 04:00 AM    GLUCOSE 130 (H) 09/24/2019 04:00 AM    BUN 11 09/24/2019 04:00 AM    CREATININE 0.79 09/24/2019 04:00 AM      Assessment:   1.  POD#1 s/p laparoscopy converted to open, CARLA, right salpingectomy, LSO, extensive JOE, repair of rectal serosal tear, and cystoscopy - pt doing well postop, stable, pain controlled  2.  Acute blood loss anemia - asymptomatic, stable    Plan:  Cont routine postop care  Clear liquid diet until return of bowel function  Ambulation encouraged  May shower when ready  Pain meds prn  Monitor VS closely  Anticipate d/c home POD#3 or POD#4  "

## 2019-09-24 NOTE — PROGRESS NOTES
"Surgical Progress Note:    POD #1 S/P Repair of rectal serosal injury and rigid sigmoidoscopy.  Doing well. Neg N/V, + FLATUS/ no BM, Pain controlled, Denies chest pain or SOB.  Ambulating. Tolerating PO. Drain with serous drainage.    PE:  /78   Pulse 95   Temp 36.3 °C (97.4 °F) (Temporal)   Resp 20   Ht 1.676 m (5' 6\")   Wt 114.5 kg (252 lb 6.8 oz)   LMP 09/09/2019 (Approximate) Comment: neg dos  SpO2 98%   BMI 40.74 kg/m²     I/O:     Intake/Output Summary (Last 24 hours) at 9/24/2019 0816  Last data filed at 9/24/2019 0400  Gross per 24 hour   Intake 4400 ml   Output 2800 ml   Net 1600 ml         Review of Systems   Constitutional: Negative.  Negative for chills and fever.   Respiratory: Negative for shortness of breath.    Cardiovascular: Negative for chest pain.   Gastrointestinal: Negative for nausea and vomiting.        +flatus/no stool   Genitourinary: Negative.      Physical Exam   Constitutional:  appears well-developed.   Neck: Neck supple.   Cardiovascular: Normal rate.    Pulmonary/Chest: Effort normal.   Abdominal: Soft.  exhibits no distension. Appropriate tenderness.   Incision clean, dry, and intact  ENRIQUE with serous drainage  Musculoskeletal: Normal range of motion.   Neurological:  alert.   Skin:  Warm and dry.   Extremities: goldstein, no edema    Labs:  Recent Labs     09/23/19  1340 09/24/19  0400   WBC 18.8* 11.0*   RBC 4.32 3.83*   HEMOGLOBIN 10.5* 9.0*   HEMATOCRIT 35.1* 31.2*   MCV 81.3* 81.5   MCH 24.3* 23.5*   RDW 51.2* 52.0*   PLATELETCT 423 436   MPV 9.0 9.2   NEUTSPOLYS  --  71.40   LYMPHOCYTES  --  19.20*   MONOCYTES  --  8.60   EOSINOPHILS  --  0.20   BASOPHILS  --  0.30   RBCMORPHOLO  --  Present     Recent Labs     09/24/19  0400   SODIUM 138   POTASSIUM 4.3   CHLORIDE 104   CO2 27   GLUCOSE 130*   BUN 11         A/P:     - Clear liquid diet as tolerated. Awaiting return of GI function  - IS Q One hour while awake  - Ambulate.  Out of bed for all meals please  - Pain " controlled.  Cont PO pain medication  - Labs noted, recheck in am  - Cont drain  - DVT propholaxis, sequentials and ambulate  - Adequate urine output.  Cont, to monitor        Discussed with Patient, RN and Dr. Lucy Sandra A.PORQUIDEA  [facility]  [phone]

## 2019-09-25 LAB
BASOPHILS # BLD AUTO: 0.5 % (ref 0–1.8)
BASOPHILS # BLD AUTO: 0.8 % (ref 0–1.8)
BASOPHILS # BLD: 0.03 K/UL (ref 0–0.12)
BASOPHILS # BLD: 0.06 K/UL (ref 0–0.12)
EOSINOPHIL # BLD AUTO: 0.2 K/UL (ref 0–0.51)
EOSINOPHIL # BLD AUTO: 0.34 K/UL (ref 0–0.51)
EOSINOPHIL NFR BLD: 3.1 % (ref 0–6.9)
EOSINOPHIL NFR BLD: 4.6 % (ref 0–6.9)
ERYTHROCYTE [DISTWIDTH] IN BLOOD BY AUTOMATED COUNT: 51.7 FL (ref 35.9–50)
ERYTHROCYTE [DISTWIDTH] IN BLOOD BY AUTOMATED COUNT: 52.6 FL (ref 35.9–50)
HCT VFR BLD AUTO: 26.7 % (ref 37–47)
HCT VFR BLD AUTO: 28.8 % (ref 37–47)
HGB BLD-MCNC: 7.9 G/DL (ref 12–16)
HGB BLD-MCNC: 8.1 G/DL (ref 12–16)
IMM GRANULOCYTES # BLD AUTO: 0.03 K/UL (ref 0–0.11)
IMM GRANULOCYTES # BLD AUTO: 0.04 K/UL (ref 0–0.11)
IMM GRANULOCYTES NFR BLD AUTO: 0.5 % (ref 0–0.9)
IMM GRANULOCYTES NFR BLD AUTO: 0.5 % (ref 0–0.9)
LYMPHOCYTES # BLD AUTO: 1.61 K/UL (ref 1–4.8)
LYMPHOCYTES # BLD AUTO: 2.14 K/UL (ref 1–4.8)
LYMPHOCYTES NFR BLD: 25.1 % (ref 22–41)
LYMPHOCYTES NFR BLD: 29.2 % (ref 22–41)
MCH RBC QN AUTO: 23.4 PG (ref 27–33)
MCH RBC QN AUTO: 24.5 PG (ref 27–33)
MCHC RBC AUTO-ENTMCNC: 28.1 G/DL (ref 33.6–35)
MCHC RBC AUTO-ENTMCNC: 29.6 G/DL (ref 33.6–35)
MCV RBC AUTO: 82.9 FL (ref 81.4–97.8)
MCV RBC AUTO: 83.2 FL (ref 81.4–97.8)
MONOCYTES # BLD AUTO: 0.44 K/UL (ref 0–0.85)
MONOCYTES # BLD AUTO: 0.57 K/UL (ref 0–0.85)
MONOCYTES NFR BLD AUTO: 6.9 % (ref 0–13.4)
MONOCYTES NFR BLD AUTO: 7.8 % (ref 0–13.4)
NEUTROPHILS # BLD AUTO: 4.1 K/UL (ref 2–7.15)
NEUTROPHILS # BLD AUTO: 4.18 K/UL (ref 2–7.15)
NEUTROPHILS NFR BLD: 57.1 % (ref 44–72)
NEUTROPHILS NFR BLD: 63.9 % (ref 44–72)
NRBC # BLD AUTO: 0 K/UL
NRBC # BLD AUTO: 0 K/UL
NRBC BLD-RTO: 0 /100 WBC
NRBC BLD-RTO: 0 /100 WBC
PLATELET # BLD AUTO: 340 K/UL (ref 164–446)
PLATELET # BLD AUTO: 351 K/UL (ref 164–446)
PMV BLD AUTO: 8.8 FL (ref 9–12.9)
PMV BLD AUTO: 9.2 FL (ref 9–12.9)
RBC # BLD AUTO: 3.22 M/UL (ref 4.2–5.4)
RBC # BLD AUTO: 3.46 M/UL (ref 4.2–5.4)
WBC # BLD AUTO: 6.4 K/UL (ref 4.8–10.8)
WBC # BLD AUTO: 7.3 K/UL (ref 4.8–10.8)

## 2019-09-25 PROCEDURE — A9270 NON-COVERED ITEM OR SERVICE: HCPCS | Performed by: OBSTETRICS & GYNECOLOGY

## 2019-09-25 PROCEDURE — 85025 COMPLETE CBC W/AUTO DIFF WBC: CPT

## 2019-09-25 PROCEDURE — 700102 HCHG RX REV CODE 250 W/ 637 OVERRIDE(OP): Performed by: OBSTETRICS & GYNECOLOGY

## 2019-09-25 PROCEDURE — 700112 HCHG RX REV CODE 229: Performed by: OBSTETRICS & GYNECOLOGY

## 2019-09-25 PROCEDURE — 36415 COLL VENOUS BLD VENIPUNCTURE: CPT

## 2019-09-25 PROCEDURE — 770020 HCHG ROOM/CARE - TELE (206)

## 2019-09-25 RX ADMIN — DOCUSATE SODIUM 100 MG: 100 CAPSULE, LIQUID FILLED ORAL at 05:57

## 2019-09-25 RX ADMIN — CELECOXIB 200 MG: 200 CAPSULE ORAL at 05:57

## 2019-09-25 RX ADMIN — DOCUSATE SODIUM 100 MG: 100 CAPSULE, LIQUID FILLED ORAL at 17:56

## 2019-09-25 RX ADMIN — ACETAMINOPHEN 1000 MG: 500 TABLET ORAL at 05:57

## 2019-09-25 RX ADMIN — ACETAMINOPHEN 1000 MG: 500 TABLET ORAL at 00:08

## 2019-09-25 RX ADMIN — CELECOXIB 200 MG: 200 CAPSULE ORAL at 17:56

## 2019-09-25 RX ADMIN — SENNOSIDES, DOCUSATE SODIUM 1 TABLET: 50; 8.6 TABLET, FILM COATED ORAL at 21:31

## 2019-09-25 RX ADMIN — CITALOPRAM HYDROBROMIDE 30 MG: 40 TABLET ORAL at 21:31

## 2019-09-25 RX ADMIN — ACETAMINOPHEN 1000 MG: 500 TABLET ORAL at 17:57

## 2019-09-25 RX ADMIN — ACETAMINOPHEN 1000 MG: 500 TABLET ORAL at 10:42

## 2019-09-25 NOTE — PROGRESS NOTES
"Surgical Progress Note:    POD #2 S/P Repair of rectal serosal injury and rigid sigmoidoscopy.  Doing well. Neg N/V, + FLATUS/ + BM, Pain controlled, Denies chest pain or SOB.  Ambulating. Tolerating PO. Drain with serous drainage, Will D/C.      PE:  /62   Pulse 74   Temp 36.6 °C (97.8 °F) (Temporal)   Resp 18   Ht 1.676 m (5' 6\")   Wt 114.5 kg (252 lb 6.8 oz)   LMP 09/09/2019 (Approximate) Comment: neg dos  SpO2 91%   BMI 40.74 kg/m²     I/O:     Intake/Output Summary (Last 24 hours) at 9/24/2019 0816  Last data filed at 9/24/2019 0400  Gross per 24 hour   Intake 4400 ml   Output 2800 ml   Net 1600 ml         Review of Systems   Constitutional: Negative.  Negative for chills and fever.   Respiratory: Negative for shortness of breath.    Cardiovascular: Negative for chest pain.   Gastrointestinal: Negative for nausea and vomiting.        +flatus/+ stool   Genitourinary: Negative.      Physical Exam   Constitutional:  appears well-developed.   Neck: Neck supple.   Cardiovascular: Normal rate.    Pulmonary/Chest: Effort normal.   Abdominal: Soft.  exhibits no distension. Appropriate tenderness.   Incision clean, dry, and intact  ENRIQUE with serous drainage  Musculoskeletal: Normal range of motion.   Neurological:  alert.   Skin:  Warm and dry.   Extremities: goldstein, no edema    Labs:  Recent Labs     09/23/19  1340 09/24/19  0400 09/25/19  0419   WBC 18.8* 11.0* 7.3   RBC 4.32 3.83* 3.46*   HEMOGLOBIN 10.5* 9.0* 8.1*   HEMATOCRIT 35.1* 31.2* 28.8*   MCV 81.3* 81.5 83.2   MCH 24.3* 23.5* 23.4*   RDW 51.2* 52.0* 52.6*   PLATELETCT 423 436 351   MPV 9.0 9.2 8.8*   NEUTSPOLYS  --  71.40 57.10   LYMPHOCYTES  --  19.20* 29.20   MONOCYTES  --  8.60 7.80   EOSINOPHILS  --  0.20 4.60   BASOPHILS  --  0.30 0.80   RBCMORPHOLO  --  Present  --      Recent Labs     09/24/19  0400   SODIUM 138   POTASSIUM 4.3   CHLORIDE 104   CO2 27   GLUCOSE 130*   BUN 11         A/P:     - Advance diet  - IS Q One hour while awake  - " Ambulate.  Out of bed for all meals please  - Pain controlled.  Cont PO pain medication  - Labs noted, recheck in am  - D/C  drain  - DVT propholaxis, sequentials and ambulate  - Adequate urine output.  Cont, to monitor  - Will sign off.  D/C per primary.  F/U with Dr. Lucy WHITT        Discussed with Patient, RN and MAHNAZ Marquez  [facility]  [phone]

## 2019-09-25 NOTE — PROGRESS NOTES
Bedside report received.  Assessment complete.  A&O x 4. Patient calls appropriately.  Patient ambulatory with sb assist.   Patient has 6/10 pain. Medicated per MAR  Denies N&V. Tolerating clear liquid diet.  Surgical sites covered in dressings CDI.  + void, + flatus, 9/24 BM.  Patient denies SOB.  SCD's ON.  Review plan with of care with patient. Call light and personal belongings with in reach. Hourly rounding in place. All needs met at this time.

## 2019-09-25 NOTE — PROGRESS NOTES
"S: Pt sleeping on my arrival, states doing well but was dizzy this morning after walking/standing for a long period, no other episodes of dizziness, ambulating to bathroom without dizziness, passing flatus and has had 2 BMs, hungry, wants to eat, voiding, no vaginal or rectal bleeding, no fevers/chills, pain controlled on PO meds    O:   /62   Pulse 74   Temp 36.6 °C (97.8 °F) (Temporal)   Resp 18   Ht 1.676 m (5' 6\")   Wt 114.5 kg (252 lb 6.8 oz)   SpO2 91%   Gen - NAD, comfortable, appears tired  CVS - RRR  Resp - CTA bilat  Abd - soft, nondistended, no rebound or guarding, nontender, bowel sounds present  Ext - nontender, no C/C/E  Incisions - all healing well, clean/dry/intact  ENRIQUE drain with minimal serosanguinous drainage in tubing, no jessica blood    Assessment:   1.  POD#2 s/p laparoscopy converted to open, CARLA, right salpingectomy, LSO, extensive JOE, repair of rectal serosal tear, and cystoscopy - pt doing well postop overall, stable, pain controlled  2.  Acute blood loss anemia - symptomatic early this a.m for first time, Hgb down to 8.1, suspect still equilibrating from surgery blood loss and IVF received     Plan:  Cont routine postop care  Advance diet as tolerated to regular  Ambulation encouraged  Recheck CBC this afternoon  If stable this afternoon will d/c ENRIQUE drain  Pain meds prn  Monitor VS closely  Anticipate d/c home POD#3 or POD#4  "

## 2019-09-26 VITALS
OXYGEN SATURATION: 93 % | SYSTOLIC BLOOD PRESSURE: 111 MMHG | HEIGHT: 66 IN | BODY MASS INDEX: 40.57 KG/M2 | RESPIRATION RATE: 16 BRPM | WEIGHT: 252.43 LBS | TEMPERATURE: 97.1 F | DIASTOLIC BLOOD PRESSURE: 76 MMHG | HEART RATE: 96 BPM

## 2019-09-26 PROCEDURE — 700112 HCHG RX REV CODE 229: Performed by: OBSTETRICS & GYNECOLOGY

## 2019-09-26 PROCEDURE — 700111 HCHG RX REV CODE 636 W/ 250 OVERRIDE (IP): Performed by: OBSTETRICS & GYNECOLOGY

## 2019-09-26 PROCEDURE — 700102 HCHG RX REV CODE 250 W/ 637 OVERRIDE(OP): Performed by: OBSTETRICS & GYNECOLOGY

## 2019-09-26 PROCEDURE — A9270 NON-COVERED ITEM OR SERVICE: HCPCS | Performed by: OBSTETRICS & GYNECOLOGY

## 2019-09-26 RX ORDER — IBUPROFEN 800 MG/1
800 TABLET ORAL 3 TIMES DAILY PRN
Qty: 40 TAB | Refills: 0 | Status: SHIPPED | OUTPATIENT
Start: 2019-09-26

## 2019-09-26 RX ORDER — FERROUS GLUCONATE 324(37.5)
324 TABLET ORAL 2 TIMES DAILY
Qty: 60 TAB | Refills: 0 | Status: SHIPPED | OUTPATIENT
Start: 2019-09-26

## 2019-09-26 RX ADMIN — DOCUSATE SODIUM 100 MG: 100 CAPSULE, LIQUID FILLED ORAL at 06:37

## 2019-09-26 RX ADMIN — ACETAMINOPHEN 1000 MG: 500 TABLET ORAL at 01:11

## 2019-09-26 RX ADMIN — DIPHENHYDRAMINE HYDROCHLORIDE 25 MG: 50 INJECTION INTRAMUSCULAR; INTRAVENOUS at 03:38

## 2019-09-26 RX ADMIN — CELECOXIB 200 MG: 200 CAPSULE ORAL at 06:37

## 2019-09-26 RX ADMIN — ACETAMINOPHEN 1000 MG: 500 TABLET ORAL at 06:37

## 2019-09-26 NOTE — DISCHARGE INSTRUCTIONS
Discharge Instructions      Pelvic rest for 8 weeks.  Keep dressing on for 2 weeks postop.  No lifting more than 10 lbs.  May shower daily, no tubs, no exercise, may go for small walks daily.       Discharged to home by car with relative. Discharged via wheelchair, hospital escort: Yes.  Special equipment needed: Not Applicable    Be sure to schedule a follow-up appointment with your primary care doctor or any specialists as instructed.     Discharge Plan:   Influenza Vaccine Indication: Not indicated: Previously immunized this influenza season and > 8 years of age    I understand that a diet low in cholesterol, fat, and sodium is recommended for good health. Unless I have been given specific instructions below for another diet, I accept this instruction as my diet prescription.   Other diet: Regular    Special Instructions: None    · Is patient discharged on Warfarin / Coumadin?   No     Depression / Suicide Risk    As you are discharged from this [facility] facility, it is important to learn how to keep safe from harming yourself.    Recognize the warning signs:  · Abrupt changes in personality, positive or negative- including increase in energy   · Giving away possessions  · Change in eating patterns- significant weight changes-  positive or negative  · Change in sleeping patterns- unable to sleep or sleeping all the time   · Unwillingness or inability to communicate  · Depression  · Unusual sadness, discouragement and loneliness  · Talk of wanting to die  · Neglect of personal appearance   · Rebelliousness- reckless behavior  · Withdrawal from people/activities they love  · Confusion- inability to concentrate     If you or a loved one observes any of these behaviors or has concerns about self-harm, here's what you can do:  · Talk about it- your feelings and reasons for harming yourself  · Remove any means that you might use to hurt yourself (examples: pills, rope, extension cords, firearm)  · Get professional  help from the community (Mental Health, Substance Abuse, psychological counseling)  · Do not be alone:Call your Safe Contact- someone whom you trust who will be there for you.  · Call your local CRISIS HOTLINE 797-7560 or 046-893-8781  · Call your local Children's Mobile Crisis Response Team [place] (072) 085-7850 or www.[employer]  · Call the toll free National Suicide Prevention Hotlines   · National Suicide Prevention Lifeline 695-283-YYMG (7974)  · National Hope Line Network 800-SUICIDE (433-6660)

## 2019-09-26 NOTE — DISCHARGE SUMMARY
"Discharge Summary    Admission Date: 9/23/19    Discharge Date: 9/26/19    Diagnosis:  Menorrhagia  Dysmenorrhea  Fibroids  Chronic pelvic pain    Discharge Dx:  1.  POD#3 s/p laparoscopy converted to open, CARLA, right salpingectomy, LSO, extensive JOE, repair of rectal serosal tear, and cystoscopy - pt doing well postop overall, stable, pain controlled  2.  Acute blood loss anemia - asymptomatic    Subjective: Pain controlled. No VB. Passing flatus. Has had BM. Eating, voiding and ambulating without difficulty. Wants to go home.    /66   Pulse 70   Temp 36.8 °C (98.3 °F) (Temporal)   Resp 18   Ht 1.676 m (5' 6\")   Wt 114.5 kg (252 lb 6.8 oz)   LMP 09/09/2019 (Approximate) Comment: neg dos  SpO2 94%   BMI 40.74 kg/m²       GEN: NAD  GI:soft, NT, ND  Incisions healing well, clean/dry/intact  EXT:no edema      Discharge Instructions   1. Diet : general  2. Activity: Pelvic rest for 8 weeks     Janel Benton   Home Medication Instructions [id number]    Printed on:09/26/19 0711   Medication Information                      Albuterol Sulfate 108 (90 Base) MCG/ACT AEROSOL POWDER, BREATH ACTIVATED  Inhale  by mouth 2 times a day as needed.             citalopram (CELEXA) 20 MG Tab  Take 30 mg by mouth every day.             diphenhydrAMINE (BENADRYL) 25 MG Tab  Take 25 mg by mouth as needed for Itching.             ferrous gluconate 324 (37.5 Fe) MG Tab  Take 1 Tab by mouth 2 Times a Day.             ibuprofen (MOTRIN) 800 MG Tab  Take 1 Tab by mouth 3 times a day as needed.             valacyclovir (VALTREX) 1 GM Tab  Take 1,000 mg by mouth every day.                 Follow up: 1-2 weeks for incision check    Complications:none    Jaimie Espinoza M.D.  "

## 2019-09-27 NOTE — PROGRESS NOTES
Monitor summary:    Sinus rhythm - sinus tachycardia, .  .18/.08/.40    D/c'd at 0858.    Per monitor summary report.

## 2021-06-07 ENCOUNTER — HOSPITAL ENCOUNTER (OUTPATIENT)
Dept: HOSPITAL 8 - CFH | Age: 36
Discharge: HOME | End: 2021-06-07
Attending: FAMILY MEDICINE
Payer: COMMERCIAL

## 2021-06-07 DIAGNOSIS — N83.201: ICD-10-CM

## 2021-06-07 DIAGNOSIS — K76.0: Primary | ICD-10-CM

## 2021-06-07 DIAGNOSIS — R10.84: ICD-10-CM

## 2021-06-07 DIAGNOSIS — R10.31: ICD-10-CM

## 2021-06-07 DIAGNOSIS — R11.11: ICD-10-CM

## 2021-06-07 PROCEDURE — 76830 TRANSVAGINAL US NON-OB: CPT

## 2021-06-07 PROCEDURE — 76700 US EXAM ABDOM COMPLETE: CPT

## 2022-06-17 NOTE — PROGRESS NOTES
Bedside report received.  Assessment complete.  A&O x 4. Patient calls appropriately.  Patient ambulatory with no assist.   Patient has 0/10 pain.   Denies N&V. Tolerating regular diet.  Surgical sites covered in dressing CDI.  + void, + flatus, 9/24 BM.  Patient denies SOB.  SCD's on.  Review plan with of care with patient. Call light and personal belongings with in reach. Hourly rounding in place. All needs met at this time.   Cath note - brief post cath note/Event Note

## (undated) DEVICE — SLEEVE, VASO, THIGH, MED

## (undated) DEVICE — PAD SANITARY 11IN MAXI IND WRAPPED  (12EA/PK 24PK/CA)

## (undated) DEVICE — TROCAR 5X100 NON BLADED Z-TH - READ KII (6/BX)

## (undated) DEVICE — CANISTER SUCTION RIGID RED 1500CC (40EA/CA)

## (undated) DEVICE — CANISTER SUCTION 3000ML MECHANICAL FILTER AUTO SHUTOFF MEDI-VAC NONSTERILE LF DISP  (40EA/CA)

## (undated) DEVICE — SUTURE 0 VICRYL PLUS CT-1 - 36 INCH (36/BX)

## (undated) DEVICE — WATER IRRIGATION STERILE 1000ML (12EA/CA)

## (undated) DEVICE — GLOVE BIOGEL SZ 7 SURGICAL PF LTX - (50PR/BX 4BX/CA)

## (undated) DEVICE — SUCTION INSTRUMENT YANKAUER BULBOUS TIP W/O VENT (50EA/CA)

## (undated) DEVICE — CANNULA W/SEAL 5X100 Z-THRE - ADED KII (12/BX)

## (undated) DEVICE — ARMREST CRADLE FOAM - (2PR/PK 12PR/CA)

## (undated) DEVICE — GLOVE BIOGEL SZ 6 PF LATEX - (50EA/BX 4BX/CA)

## (undated) DEVICE — APPLIER OPEN MEDIUM CLIP (6EA/BX)

## (undated) DEVICE — DRESSING TRANSPARENT FILM TEGADERM 2.375 X 2.75"  (100EA/BX)"

## (undated) DEVICE — ELECTRODE 5MM LHK LAPSCP STERILE DISP- MEGADYNE  (5/CA)

## (undated) DEVICE — TUBE CONNECTING SUCTION - CLEAR PLASTIC STERILE 72 IN (50EA/CA)

## (undated) DEVICE — TUBING SETDISPOS HIGH FLOW II - (10/BX)

## (undated) DEVICE — SUTURE GENERAL

## (undated) DEVICE — SUTURE 0 VICRYL PLUS CT-1 - 8 X 18 INCH (12/BX)

## (undated) DEVICE — SUTURE 0 VICRYL PLUS UR-6 - 27 INCH (36/BX)

## (undated) DEVICE — SPONGE XRAY 8X4 STERL. 12PL - (10EA/TY 80TY/CA)

## (undated) DEVICE — BAG RETRIEVAL 10ML (10EA/BX)

## (undated) DEVICE — SUTURE 2-0 VICRYL PLUS CT-2 - 27 INCH (36/BX)

## (undated) DEVICE — SET EXTENSION WITH 2 PORTS (48EA/CA) ***PART #2C8610 IS A SUBSTITUTE*****

## (undated) DEVICE — TUBING CLEARLINK DUO-VENT - C-FLO (48EA/CA)

## (undated) DEVICE — HEAD HOLDER JUNIOR/ADULT

## (undated) DEVICE — HEMOSTAT ARISTA PWD 5 GRAM - (5/BX)

## (undated) DEVICE — GLOVE BIOGEL INDICATOR SZ 7SURGICAL PF LTX - (50/BX 4BX/CA)

## (undated) DEVICE — BLADE SURGICAL #15 - (50/BX 3BX/CA)

## (undated) DEVICE — ELECTRODE 850 FOAM ADHESIVE - HYDROGEL RADIOTRNSPRNT (50/PK)

## (undated) DEVICE — NEPTUNE 4 PORT MANIFOLD - (20/PK)

## (undated) DEVICE — ENDOSTITCH10MM SUTURING DEVIC - (3/CA)

## (undated) DEVICE — SUTURE 4-0 MONOCRYL PLUS PS-2 - 27 INCH (36/BX)

## (undated) DEVICE — GLOVE BIOGEL SZ 6.5 SURGICAL PF LTX (50PR/BX 4BX/CA)

## (undated) DEVICE — TAPE CLOTH MEDIPORE 6 INCH - (12RL/CA)

## (undated) DEVICE — KIT ANESTHESIA W/CIRCUIT & 3/LT BAG W/FILTER (20EA/CA)

## (undated) DEVICE — SUTURE CLOSURE DEVISE V180 - P-12 (12/BX)

## (undated) DEVICE — SODIUM CHL IRRIGATION 0.9% 1000ML (12EA/CA)

## (undated) DEVICE — CANNULA W/SEAL11X100ZTHREAD - (12/BX)

## (undated) DEVICE — KIT ROOM DECONTAMINATION

## (undated) DEVICE — TROCAR 5X100 SEPARTATOR ADV - FIXATION (6/BX)

## (undated) DEVICE — KIT  I.V. START (100EA/CA)

## (undated) DEVICE — MASK ANESTHESIA ADULT  - (100/CA)

## (undated) DEVICE — ENDO PEANUT 5MM DEVICE (12EA/BX)

## (undated) DEVICE — DRAPESURG STERI-DRAPE LONG - (10/BX 4BX/CA)

## (undated) DEVICE — SENSOR SPO2 NEO LNCS ADHESIVE (20/BX) SEE USER NOTES

## (undated) DEVICE — GOWN WARMING STANDARD FLEX - (30/CA)

## (undated) DEVICE — TRAY SRGPRP PVP IOD WT PRP - (20/CA)

## (undated) DEVICE — CHLORAPREP 26 ML APPLICATOR - ORANGE TINT(25/CA)

## (undated) DEVICE — CATHETER IV 20 GA X 1-1/4 ---SURG.& SDS ONLY--- (50EA/BX)

## (undated) DEVICE — SUTURE 3-0 SILK SH 30 (36PK/BX)

## (undated) DEVICE — UTERINE MANIP V-CARE LARGE - (DAVINCI)  8/CA

## (undated) DEVICE — SET SUCTION/IRRIGATION WITH DISPOSABLE TIP (6/CA )PART #0250-070-520 IS A SUB

## (undated) DEVICE — TRAY CATHETER FOLEY URINE METER W/STATLOCK 350ML (10EA/CA)

## (undated) DEVICE — SET LEADWIRE 5 LEAD BEDSIDE DISPOSABLE ECG (1SET OF 5/EA)

## (undated) DEVICE — DERMABOND ADVANCED - (12EA/BX)

## (undated) DEVICE — LACTATED RINGERS INJ 1000 ML - (14EA/CA 60CA/PF)

## (undated) DEVICE — SPONGE DRAIN 4 X 4IN 6-PLY - (2/PK25PK/BX12BX/CS)

## (undated) DEVICE — TROCAR Z THREAD11MM OPTICAL - NON BLADED(6/BX)

## (undated) DEVICE — LIGASURE LAPAROSCOPIC 5MM - (6EA/CA)